# Patient Record
Sex: FEMALE | Race: BLACK OR AFRICAN AMERICAN | NOT HISPANIC OR LATINO
[De-identification: names, ages, dates, MRNs, and addresses within clinical notes are randomized per-mention and may not be internally consistent; named-entity substitution may affect disease eponyms.]

---

## 2017-06-16 ENCOUNTER — APPOINTMENT (OUTPATIENT)
Dept: OBGYN | Facility: CLINIC | Age: 27
End: 2017-06-16

## 2017-06-16 ENCOUNTER — LABORATORY RESULT (OUTPATIENT)
Age: 27
End: 2017-06-16

## 2017-06-16 VITALS
SYSTOLIC BLOOD PRESSURE: 119 MMHG | WEIGHT: 172 LBS | HEIGHT: 66 IN | DIASTOLIC BLOOD PRESSURE: 73 MMHG | BODY MASS INDEX: 27.64 KG/M2 | HEART RATE: 93 BPM

## 2017-06-16 DIAGNOSIS — K90.9 INTESTINAL MALABSORPTION, UNSPECIFIED: ICD-10-CM

## 2017-06-16 DIAGNOSIS — L73.8 OTHER SPECIFIED FOLLICULAR DISORDERS: ICD-10-CM

## 2017-06-20 LAB
ALBUMIN SERPL ELPH-MCNC: 4.5 G/DL
ALP BLD-CCNC: 96 U/L
ALT SERPL-CCNC: 10 U/L
ANION GAP SERPL CALC-SCNC: 15 MMOL/L
AST SERPL-CCNC: 13 U/L
BASOPHILS # BLD AUTO: 0.02 K/UL
BASOPHILS NFR BLD AUTO: 0.5 %
BILIRUB SERPL-MCNC: 0.6 MG/DL
BUN SERPL-MCNC: 12 MG/DL
CALCIUM SERPL-MCNC: 9.4 MG/DL
CHLORIDE SERPL-SCNC: 101 MMOL/L
CO2 SERPL-SCNC: 25 MMOL/L
CREAT SERPL-MCNC: 0.8 MG/DL
EOSINOPHIL # BLD AUTO: 0.12 K/UL
EOSINOPHIL NFR BLD AUTO: 2.7 %
GLUCOSE SERPL-MCNC: 72 MG/DL
HCT VFR BLD CALC: 40.4 %
HGB BLD-MCNC: 12.6 G/DL
IMM GRANULOCYTES NFR BLD AUTO: 0.2 %
LYMPHOCYTES # BLD AUTO: 1.65 K/UL
LYMPHOCYTES NFR BLD AUTO: 37.5 %
MAN DIFF?: NORMAL
MCHC RBC-ENTMCNC: 27.9 PG
MCHC RBC-ENTMCNC: 31.2 GM/DL
MCV RBC AUTO: 89.4 FL
MONOCYTES # BLD AUTO: 0.31 K/UL
MONOCYTES NFR BLD AUTO: 7 %
NEUTROPHILS # BLD AUTO: 2.29 K/UL
NEUTROPHILS NFR BLD AUTO: 52.1 %
PLATELET # BLD AUTO: 351 K/UL
POTASSIUM SERPL-SCNC: 3.8 MMOL/L
PROT SERPL-MCNC: 8 G/DL
RBC # BLD: 4.52 M/UL
RBC # FLD: 12.4 %
SODIUM SERPL-SCNC: 141 MMOL/L
T3FREE SERPL-MCNC: 2.89 PG/ML
T3RU NFR SERPL: 1.01 INDEX
T4 FREE SERPL-MCNC: 1.2 NG/DL
T4 SERPL-MCNC: 6.8 UG/DL
TSH SERPL-ACNC: 0.21 UIU/ML
WBC # FLD AUTO: 4.4 K/UL

## 2017-07-28 ENCOUNTER — EMERGENCY (EMERGENCY)
Facility: HOSPITAL | Age: 27
LOS: 1 days | Discharge: HOME | End: 2017-07-28
Attending: EMERGENCY MEDICINE | Admitting: EMERGENCY MEDICINE
Payer: COMMERCIAL

## 2017-07-28 VITALS
DIASTOLIC BLOOD PRESSURE: 78 MMHG | SYSTOLIC BLOOD PRESSURE: 120 MMHG | HEART RATE: 60 BPM | RESPIRATION RATE: 18 BRPM | TEMPERATURE: 100 F | OXYGEN SATURATION: 100 %

## 2017-07-28 DIAGNOSIS — Z98.84 BARIATRIC SURGERY STATUS: Chronic | ICD-10-CM

## 2017-07-28 DIAGNOSIS — O09.293 SUPERVISION OF PREGNANCY WITH OTHER POOR REPRODUCTIVE OR OBSTETRIC HISTORY, THIRD TRIMESTER: Chronic | ICD-10-CM

## 2017-07-28 DIAGNOSIS — Z98.89 OTHER SPECIFIED POSTPROCEDURAL STATES: Chronic | ICD-10-CM

## 2017-07-28 LAB
ALBUMIN SERPL ELPH-MCNC: 4.2 G/DL — SIGNIFICANT CHANGE UP (ref 3.3–5)
ALP SERPL-CCNC: 97 U/L — SIGNIFICANT CHANGE UP (ref 40–120)
ALT FLD-CCNC: 8 U/L — SIGNIFICANT CHANGE UP (ref 4–33)
AST SERPL-CCNC: 13 U/L — SIGNIFICANT CHANGE UP (ref 4–32)
BASOPHILS # BLD AUTO: 0.02 K/UL — SIGNIFICANT CHANGE UP (ref 0–0.2)
BASOPHILS NFR BLD AUTO: 0.4 % — SIGNIFICANT CHANGE UP (ref 0–2)
BILIRUB SERPL-MCNC: 0.4 MG/DL — SIGNIFICANT CHANGE UP (ref 0.2–1.2)
BUN SERPL-MCNC: 9 MG/DL — SIGNIFICANT CHANGE UP (ref 7–23)
CALCIUM SERPL-MCNC: 9.3 MG/DL — SIGNIFICANT CHANGE UP (ref 8.4–10.5)
CHLORIDE SERPL-SCNC: 100 MMOL/L — SIGNIFICANT CHANGE UP (ref 98–107)
CO2 SERPL-SCNC: 29 MMOL/L — SIGNIFICANT CHANGE UP (ref 22–31)
CREAT SERPL-MCNC: 0.82 MG/DL — SIGNIFICANT CHANGE UP (ref 0.5–1.3)
EOSINOPHIL # BLD AUTO: 0.07 K/UL — SIGNIFICANT CHANGE UP (ref 0–0.5)
EOSINOPHIL NFR BLD AUTO: 1.4 % — SIGNIFICANT CHANGE UP (ref 0–6)
GLUCOSE SERPL-MCNC: 86 MG/DL — SIGNIFICANT CHANGE UP (ref 70–99)
HCT VFR BLD CALC: 38.8 % — SIGNIFICANT CHANGE UP (ref 34.5–45)
HGB BLD-MCNC: 12.2 G/DL — SIGNIFICANT CHANGE UP (ref 11.5–15.5)
IMM GRANULOCYTES # BLD AUTO: 0.01 # — SIGNIFICANT CHANGE UP
IMM GRANULOCYTES NFR BLD AUTO: 0.2 % — SIGNIFICANT CHANGE UP (ref 0–1.5)
INR BLD: 1.03 — SIGNIFICANT CHANGE UP (ref 0.88–1.17)
LYMPHOCYTES # BLD AUTO: 1.28 K/UL — SIGNIFICANT CHANGE UP (ref 1–3.3)
LYMPHOCYTES # BLD AUTO: 24.8 % — SIGNIFICANT CHANGE UP (ref 13–44)
MCHC RBC-ENTMCNC: 28.2 PG — SIGNIFICANT CHANGE UP (ref 27–34)
MCHC RBC-ENTMCNC: 31.4 % — LOW (ref 32–36)
MCV RBC AUTO: 89.6 FL — SIGNIFICANT CHANGE UP (ref 80–100)
MONOCYTES # BLD AUTO: 0.39 K/UL — SIGNIFICANT CHANGE UP (ref 0–0.9)
MONOCYTES NFR BLD AUTO: 7.5 % — SIGNIFICANT CHANGE UP (ref 2–14)
NEUTROPHILS # BLD AUTO: 3.4 K/UL — SIGNIFICANT CHANGE UP (ref 1.8–7.4)
NEUTROPHILS NFR BLD AUTO: 65.7 % — SIGNIFICANT CHANGE UP (ref 43–77)
NRBC # FLD: 0 — SIGNIFICANT CHANGE UP
PLATELET # BLD AUTO: 277 K/UL — SIGNIFICANT CHANGE UP (ref 150–400)
PMV BLD: 9 FL — SIGNIFICANT CHANGE UP (ref 7–13)
POTASSIUM SERPL-MCNC: 4.4 MMOL/L — SIGNIFICANT CHANGE UP (ref 3.5–5.3)
POTASSIUM SERPL-SCNC: 4.4 MMOL/L — SIGNIFICANT CHANGE UP (ref 3.5–5.3)
PROT SERPL-MCNC: 7.9 G/DL — SIGNIFICANT CHANGE UP (ref 6–8.3)
PROTHROM AB SERPL-ACNC: 11.6 SEC — SIGNIFICANT CHANGE UP (ref 9.8–13.1)
RBC # BLD: 4.33 M/UL — SIGNIFICANT CHANGE UP (ref 3.8–5.2)
RBC # FLD: 11.5 % — SIGNIFICANT CHANGE UP (ref 10.3–14.5)
SODIUM SERPL-SCNC: 139 MMOL/L — SIGNIFICANT CHANGE UP (ref 135–145)
WBC # BLD: 5.17 K/UL — SIGNIFICANT CHANGE UP (ref 3.8–10.5)
WBC # FLD AUTO: 5.17 K/UL — SIGNIFICANT CHANGE UP (ref 3.8–10.5)

## 2017-07-28 PROCEDURE — 99284 EMERGENCY DEPT VISIT MOD MDM: CPT

## 2017-07-28 RX ORDER — SODIUM CHLORIDE 9 MG/ML
2000 INJECTION INTRAMUSCULAR; INTRAVENOUS; SUBCUTANEOUS ONCE
Qty: 0 | Refills: 0 | Status: COMPLETED | OUTPATIENT
Start: 2017-07-28 | End: 2017-07-28

## 2017-07-28 RX ORDER — METOCLOPRAMIDE HCL 10 MG
1 TABLET ORAL
Qty: 15 | Refills: 0 | OUTPATIENT
Start: 2017-07-28 | End: 2017-08-02

## 2017-07-28 RX ORDER — ACETAMINOPHEN 500 MG
975 TABLET ORAL ONCE
Qty: 0 | Refills: 0 | Status: COMPLETED | OUTPATIENT
Start: 2017-07-28 | End: 2017-07-28

## 2017-07-28 RX ORDER — DEXAMETHASONE 0.5 MG/5ML
10 ELIXIR ORAL ONCE
Qty: 0 | Refills: 0 | Status: COMPLETED | OUTPATIENT
Start: 2017-07-28 | End: 2017-07-28

## 2017-07-28 RX ORDER — METOCLOPRAMIDE HCL 10 MG
10 TABLET ORAL ONCE
Qty: 0 | Refills: 0 | Status: COMPLETED | OUTPATIENT
Start: 2017-07-28 | End: 2017-07-28

## 2017-07-28 RX ORDER — ACETAMINOPHEN 500 MG
1 TABLET ORAL
Qty: 50 | Refills: 0 | OUTPATIENT
Start: 2017-07-28

## 2017-07-28 RX ADMIN — SODIUM CHLORIDE 1000 MILLILITER(S): 9 INJECTION INTRAMUSCULAR; INTRAVENOUS; SUBCUTANEOUS at 09:38

## 2017-07-28 RX ADMIN — Medication 975 MILLIGRAM(S): at 09:38

## 2017-07-28 RX ADMIN — Medication 102 MILLIGRAM(S): at 11:04

## 2017-07-28 RX ADMIN — Medication 10 MILLIGRAM(S): at 09:38

## 2017-07-28 RX ADMIN — Medication 975 MILLIGRAM(S): at 10:35

## 2017-07-28 NOTE — ED ADULT TRIAGE NOTE - CHIEF COMPLAINT QUOTE
states " I have a severe head ache since yesterday" took Tylenol and Advil with no relief". denies n/v/fever , denies injury.

## 2017-07-28 NOTE — ED PROVIDER NOTE - MEDICAL DECISION MAKING DETAILS
27y F presenting with frontal HA since yesterday. Likely migraine vs. tension HA. Will do labs, IV fluids, and pain meds. pt agrees to get CT head if only medicine doesn't make her feel better.

## 2017-07-28 NOTE — ED PROVIDER NOTE - PSH
H/O cervical cerclage, currently pregnant, third trimester    History of D&C  2015  S/P bariatric surgery  2012 Gastric sleeve placement

## 2017-07-28 NOTE — ED PROVIDER NOTE - OBJECTIVE STATEMENT
27y F with hx of sleep apnea presents to the ED with frontal HA since yesterday. Pt states she woke up with the HA and has been worsening throughout the day yesterday. Pt describes HA as "pulsating, pressure, squeezing, and pounding". Took Tylenol yesterday to no relief and advil today, to no relief. No vision changes. Bright light does not bother her. No fever. Does not take birth control. Non smoker. Prevents her from doing daily activities. Denies trauma. Does not usually get HA.     FHx: DM and HTN 27y F with hx of sleep apnea presents to the ED with worsening frontal HA since yesterday. Pt states she woke up with the HA. Pt describes HA as "pulsating, pressure, squeezing, and pounding". Took Tylenol yesterday, to no relief and Advil today, to no relief. No vision changes. Bright light does not bother her. No fever. Does not take birth control. Non smoker. Prevents her from doing daily activities. Denies trauma. Does not usually get HA.     FHx: DM and HTN

## 2017-07-28 NOTE — ED PROVIDER NOTE - PLAN OF CARE
Take medications as prescribed. If symptoms return several times, follow up with Neurology (see list), as these may be recurrent migraine headaches. Return if pain not controlled with oral medications.

## 2017-07-28 NOTE — ED PROVIDER NOTE - CARE PLAN
Principal Discharge DX:	Headache Principal Discharge DX:	Headache  Instructions for follow-up, activity and diet:	Take medications as prescribed. If symptoms return several times, follow up with Neurology (see list), as these may be recurrent migraine headaches. Return if pain not controlled with oral medications.

## 2017-07-28 NOTE — ED PROVIDER NOTE - PMH
Incompetent cervix in pregnancy    Missed   multiple  Sleep apnea  was using CPAP machine prior to bariatric surgery in .

## 2017-07-28 NOTE — ED PROVIDER NOTE - PROGRESS NOTE DETAILS
Shana: Headache improved. Lab results unremarkable. Discharge home. Likely migraine (has 3/5 POUND criteria).

## 2017-11-20 ENCOUNTER — CHART COPY (OUTPATIENT)
Age: 27
End: 2017-11-20

## 2017-11-29 LAB
HBV SURFACE AG SER QL: NONREACTIVE
HCV AB SER QL: NONREACTIVE
HCV S/CO RATIO: 0.12 S/CO
HIV1+2 AB SPEC QL IA.RAPID: NONREACTIVE
T PALLIDUM AB SER QL IA: NEGATIVE

## 2017-12-01 ENCOUNTER — APPOINTMENT (OUTPATIENT)
Dept: OBGYN | Facility: CLINIC | Age: 27
End: 2017-12-01
Payer: COMMERCIAL

## 2017-12-01 VITALS
SYSTOLIC BLOOD PRESSURE: 116 MMHG | HEART RATE: 91 BPM | BODY MASS INDEX: 28.93 KG/M2 | WEIGHT: 180 LBS | DIASTOLIC BLOOD PRESSURE: 72 MMHG | HEIGHT: 66 IN

## 2017-12-01 DIAGNOSIS — Z87.42 PERSONAL HISTORY OF OTHER DISEASES OF THE FEMALE GENITAL TRACT: ICD-10-CM

## 2017-12-01 PROCEDURE — 99213 OFFICE O/P EST LOW 20 MIN: CPT

## 2017-12-04 ENCOUNTER — CHART COPY (OUTPATIENT)
Age: 27
End: 2017-12-04

## 2017-12-04 LAB
CANDIDA VAG CYTO: NOT DETECTED
G VAGINALIS+PREV SP MTYP VAG QL MICRO: DETECTED
T VAGINALIS VAG QL WET PREP: NOT DETECTED

## 2018-03-30 ENCOUNTER — OTHER (OUTPATIENT)
Age: 28
End: 2018-03-30

## 2018-04-03 ENCOUNTER — OTHER (OUTPATIENT)
Age: 28
End: 2018-04-03

## 2018-04-03 LAB
CANDIDA VAG CYTO: DETECTED
G VAGINALIS+PREV SP MTYP VAG QL MICRO: NOT DETECTED
T VAGINALIS VAG QL WET PREP: NOT DETECTED

## 2018-04-11 ENCOUNTER — OTHER (OUTPATIENT)
Age: 28
End: 2018-04-11

## 2018-05-22 ENCOUNTER — APPOINTMENT (OUTPATIENT)
Dept: OBGYN | Facility: CLINIC | Age: 28
End: 2018-05-22

## 2018-06-01 ENCOUNTER — MESSAGE (OUTPATIENT)
Age: 28
End: 2018-06-01

## 2018-06-05 ENCOUNTER — RESULT REVIEW (OUTPATIENT)
Age: 28
End: 2018-06-05

## 2018-06-05 LAB
ABO + RH PNL BLD: NORMAL
BASOPHILS # BLD AUTO: 0.02 K/UL
BASOPHILS NFR BLD AUTO: 0.3 %
BLD GP AB SCN SERPL QL: NORMAL
EOSINOPHIL # BLD AUTO: 0.28 K/UL
EOSINOPHIL NFR BLD AUTO: 4.2 %
HCT VFR BLD CALC: 34.6 %
HGB BLD-MCNC: 10.7 G/DL
IMM GRANULOCYTES NFR BLD AUTO: 0.3 %
LYMPHOCYTES # BLD AUTO: 2.65 K/UL
LYMPHOCYTES NFR BLD AUTO: 39.6 %
MAN DIFF?: NORMAL
MCHC RBC-ENTMCNC: 26.6 PG
MCHC RBC-ENTMCNC: 30.9 GM/DL
MCV RBC AUTO: 86.1 FL
MONOCYTES # BLD AUTO: 0.5 K/UL
MONOCYTES NFR BLD AUTO: 7.5 %
NEUTROPHILS # BLD AUTO: 3.23 K/UL
NEUTROPHILS NFR BLD AUTO: 48.1 %
PLATELET # BLD AUTO: 312 K/UL
RBC # BLD: 4.02 M/UL
RBC # FLD: 13.2 %
WBC # FLD AUTO: 6.7 K/UL

## 2018-06-08 ENCOUNTER — APPOINTMENT (OUTPATIENT)
Dept: OBGYN | Facility: CLINIC | Age: 28
End: 2018-06-08
Payer: COMMERCIAL

## 2018-06-08 VITALS
SYSTOLIC BLOOD PRESSURE: 136 MMHG | WEIGHT: 189 LBS | BODY MASS INDEX: 30.37 KG/M2 | HEIGHT: 66 IN | DIASTOLIC BLOOD PRESSURE: 78 MMHG

## 2018-06-08 DIAGNOSIS — Z78.9 OTHER SPECIFIED HEALTH STATUS: ICD-10-CM

## 2018-06-08 DIAGNOSIS — F17.200 NICOTINE DEPENDENCE, UNSPECIFIED, UNCOMPLICATED: ICD-10-CM

## 2018-06-08 DIAGNOSIS — Z30.09 ENCOUNTER FOR OTHER GENERAL COUNSELING AND ADVICE ON CONTRACEPTION: ICD-10-CM

## 2018-06-08 PROCEDURE — 99215 OFFICE O/P EST HI 40 MIN: CPT | Mod: 25

## 2018-06-08 PROCEDURE — 76815 OB US LIMITED FETUS(S): CPT

## 2018-06-08 RX ORDER — METOCLOPRAMIDE 10 MG/1
10 TABLET ORAL
Qty: 15 | Refills: 0 | Status: COMPLETED | COMMUNITY
Start: 2017-07-28 | End: 2018-06-08

## 2018-06-08 RX ORDER — NAPROXEN 500 MG/1
500 TABLET ORAL
Qty: 28 | Refills: 0 | Status: COMPLETED | COMMUNITY
Start: 2017-07-28 | End: 2018-06-08

## 2018-06-08 RX ORDER — METRONIDAZOLE 7.5 MG/G
0.75 GEL VAGINAL
Qty: 0.38 | Refills: 3 | Status: COMPLETED | COMMUNITY
Start: 2017-12-04 | End: 2018-06-08

## 2018-06-08 RX ORDER — NORETHINDRONE ACETATE AND ETHINYL ESTRADIOL 1MG-20(24)
1-20 KIT ORAL
Qty: 84 | Refills: 3 | Status: COMPLETED | COMMUNITY
Start: 2017-02-24 | End: 2018-06-08

## 2018-06-11 ENCOUNTER — RESULT REVIEW (OUTPATIENT)
Age: 28
End: 2018-06-11

## 2018-06-11 PROBLEM — Z30.09 GENERAL COUNSELLING AND ADVICE ON CONTRACEPTION: Status: ACTIVE | Noted: 2018-06-11

## 2018-06-11 LAB
C TRACH RRNA SPEC QL NAA+PROBE: NOT DETECTED
HBV SURFACE AG SER QL: NONREACTIVE
HCV AB SER QL: NONREACTIVE
HCV S/CO RATIO: 0.14 S/CO
HIV1+2 AB SPEC QL IA.RAPID: NONREACTIVE
N GONORRHOEA RRNA SPEC QL NAA+PROBE: NOT DETECTED
SOURCE AMPLIFICATION: NORMAL
T PALLIDUM AB SER QL IA: NEGATIVE

## 2018-06-12 ENCOUNTER — CHART COPY (OUTPATIENT)
Age: 28
End: 2018-06-12

## 2018-06-15 ENCOUNTER — APPOINTMENT (OUTPATIENT)
Dept: OBGYN | Facility: CLINIC | Age: 28
End: 2018-06-15
Payer: COMMERCIAL

## 2018-06-15 VITALS — DIASTOLIC BLOOD PRESSURE: 78 MMHG | HEIGHT: 66 IN | SYSTOLIC BLOOD PRESSURE: 119 MMHG

## 2018-06-15 PROCEDURE — 99213 OFFICE O/P EST LOW 20 MIN: CPT | Mod: NC

## 2018-06-15 PROCEDURE — 76815 OB US LIMITED FETUS(S): CPT

## 2018-06-18 ENCOUNTER — TRANSCRIPTION ENCOUNTER (OUTPATIENT)
Age: 28
End: 2018-06-18

## 2018-06-18 ENCOUNTER — OUTPATIENT (OUTPATIENT)
Dept: OUTPATIENT SERVICES | Facility: HOSPITAL | Age: 28
LOS: 1 days | End: 2018-06-18
Payer: COMMERCIAL

## 2018-06-18 VITALS
HEIGHT: 66 IN | WEIGHT: 186.07 LBS | SYSTOLIC BLOOD PRESSURE: 104 MMHG | OXYGEN SATURATION: 98 % | RESPIRATION RATE: 16 BRPM | TEMPERATURE: 98 F | DIASTOLIC BLOOD PRESSURE: 69 MMHG | HEART RATE: 82 BPM

## 2018-06-18 DIAGNOSIS — Z33.2 ENCOUNTER FOR ELECTIVE TERMINATION OF PREGNANCY: ICD-10-CM

## 2018-06-18 DIAGNOSIS — O07.4 FAILED ATTEMPTED TERMINATION OF PREGNANCY WITHOUT COMPLICATION: ICD-10-CM

## 2018-06-18 DIAGNOSIS — Z01.818 ENCOUNTER FOR OTHER PREPROCEDURAL EXAMINATION: ICD-10-CM

## 2018-06-18 DIAGNOSIS — Z98.89 OTHER SPECIFIED POSTPROCEDURAL STATES: Chronic | ICD-10-CM

## 2018-06-18 DIAGNOSIS — Z98.84 BARIATRIC SURGERY STATUS: Chronic | ICD-10-CM

## 2018-06-18 DIAGNOSIS — O09.293 SUPERVISION OF PREGNANCY WITH OTHER POOR REPRODUCTIVE OR OBSTETRIC HISTORY, THIRD TRIMESTER: Chronic | ICD-10-CM

## 2018-06-18 LAB
BLD GP AB SCN SERPL QL: NEGATIVE — SIGNIFICANT CHANGE UP
HCT VFR BLD CALC: 33.6 % — LOW (ref 34.5–45)
HGB BLD-MCNC: 10.5 G/DL — LOW (ref 11.5–15.5)
MCHC RBC-ENTMCNC: 26.7 PG — LOW (ref 27–34)
MCHC RBC-ENTMCNC: 31.3 GM/DL — LOW (ref 32–36)
MCV RBC AUTO: 85.5 FL — SIGNIFICANT CHANGE UP (ref 80–100)
PLATELET # BLD AUTO: 332 K/UL — SIGNIFICANT CHANGE UP (ref 150–400)
RBC # BLD: 3.93 M/UL — SIGNIFICANT CHANGE UP (ref 3.8–5.2)
RBC # FLD: 13.5 % — SIGNIFICANT CHANGE UP (ref 10.3–14.5)
RH IG SCN BLD-IMP: POSITIVE — SIGNIFICANT CHANGE UP
WBC # BLD: 5.19 K/UL — SIGNIFICANT CHANGE UP (ref 3.8–10.5)
WBC # FLD AUTO: 5.19 K/UL — SIGNIFICANT CHANGE UP (ref 3.8–10.5)

## 2018-06-18 PROCEDURE — G0463: CPT

## 2018-06-18 PROCEDURE — 86850 RBC ANTIBODY SCREEN: CPT

## 2018-06-18 PROCEDURE — 85027 COMPLETE CBC AUTOMATED: CPT

## 2018-06-18 PROCEDURE — 86900 BLOOD TYPING SEROLOGIC ABO: CPT

## 2018-06-18 PROCEDURE — 86901 BLOOD TYPING SEROLOGIC RH(D): CPT

## 2018-06-18 RX ORDER — SODIUM CHLORIDE 9 MG/ML
3 INJECTION INTRAMUSCULAR; INTRAVENOUS; SUBCUTANEOUS EVERY 8 HOURS
Qty: 0 | Refills: 0 | Status: DISCONTINUED | OUTPATIENT
Start: 2018-06-19 | End: 2018-07-04

## 2018-06-18 RX ORDER — LIDOCAINE HCL 20 MG/ML
0.2 VIAL (ML) INJECTION ONCE
Qty: 0 | Refills: 0 | Status: DISCONTINUED | OUTPATIENT
Start: 2018-06-19 | End: 2018-07-04

## 2018-06-18 NOTE — H&P PST ADULT - PMH
Incompetent cervix in pregnancy    Missed   multiple  Sleep apnea  was using CPAP machine prior to bariatric surgery in . Now resolved Current smoker  ocassional Cigars  GERD (gastroesophageal reflux disease)    Incompetent cervix in pregnancy    Missed   multiple  Sleep apnea  was using CPAP machine prior to bariatric surgery in . Now resolved

## 2018-06-18 NOTE — H&P PST ADULT - ADMIT DATE
----- Message from Marie Davis RN sent at 6/27/2017  2:35 PM CDT -----  Nannette called to report that she has not had any relief from the Alpha Lipoic and the increase in Sertraline.  She continues to have constant burning of her tongue and the burning on the left side of her face.  She is wondering if there is anything else she could try.  Please advise.    Thank you,  T   18-Jun-2018

## 2018-06-18 NOTE — H&P PST ADULT - HISTORY OF PRESENT ILLNESS
This is a 28 y/o female  with PMH:  s/p (): Gastric Sleeve for Morbid Obesity: Current BMI 30.0,  curent: occasional Cigar smoking,  s/p ( 2018) chemical  to terminate IUP approximately 7 weeks. Followup evaluation dx: failed Medical . Scheduled: Suction Curettage for IUP/ Insertion of I.U.D.

## 2018-06-18 NOTE — H&P PST ADULT - PSH
H/O cervical cerclage, currently pregnant, third trimester  ' 2016  History of D&C  2015  S/P bariatric surgery  2012 Gastric sleeve placement ( @ [facility]) History of D&C  2015  S/P bariatric surgery  2012 Gastric sleeve placement ( @ [facility])

## 2018-06-18 NOTE — H&P PST ADULT - NSANTHOSAYNRD_GEN_A_CORE
No. JONG screening performed.  STOP BANG Legend: 0-2 = LOW Risk; 3-4 = INTERMEDIATE Risk; 5-8 = HIGH Risk No. JONG screening performed.  STOP BANG Legend: 0-2 = LOW Risk; 3-4 = INTERMEDIATE Risk; 5-8 = HIGH Risk/Neck 14 in.

## 2018-06-19 ENCOUNTER — RESULT REVIEW (OUTPATIENT)
Age: 28
End: 2018-06-19

## 2018-06-19 ENCOUNTER — APPOINTMENT (OUTPATIENT)
Dept: OBGYN | Facility: CLINIC | Age: 28
End: 2018-06-19

## 2018-06-19 ENCOUNTER — OUTPATIENT (OUTPATIENT)
Dept: OUTPATIENT SERVICES | Facility: HOSPITAL | Age: 28
LOS: 1 days | End: 2018-06-19
Payer: COMMERCIAL

## 2018-06-19 VITALS
OXYGEN SATURATION: 100 % | HEART RATE: 82 BPM | SYSTOLIC BLOOD PRESSURE: 119 MMHG | RESPIRATION RATE: 18 BRPM | DIASTOLIC BLOOD PRESSURE: 68 MMHG

## 2018-06-19 VITALS
TEMPERATURE: 98 F | HEART RATE: 73 BPM | WEIGHT: 186.07 LBS | HEIGHT: 66 IN | OXYGEN SATURATION: 100 % | RESPIRATION RATE: 16 BRPM | SYSTOLIC BLOOD PRESSURE: 106 MMHG | DIASTOLIC BLOOD PRESSURE: 67 MMHG

## 2018-06-19 DIAGNOSIS — Z98.89 OTHER SPECIFIED POSTPROCEDURAL STATES: Chronic | ICD-10-CM

## 2018-06-19 DIAGNOSIS — Z33.2 ENCOUNTER FOR ELECTIVE TERMINATION OF PREGNANCY: ICD-10-CM

## 2018-06-19 DIAGNOSIS — Z98.84 BARIATRIC SURGERY STATUS: Chronic | ICD-10-CM

## 2018-06-19 PROCEDURE — 76998 US GUIDE INTRAOP: CPT | Mod: 26

## 2018-06-19 PROCEDURE — 59840 INDUCED ABORTION D&C: CPT

## 2018-06-19 PROCEDURE — 88305 TISSUE EXAM BY PATHOLOGIST: CPT | Mod: 26

## 2018-06-19 PROCEDURE — 58300 INSERT INTRAUTERINE DEVICE: CPT

## 2018-06-19 PROCEDURE — 88305 TISSUE EXAM BY PATHOLOGIST: CPT

## 2018-06-19 RX ORDER — CELECOXIB 200 MG/1
200 CAPSULE ORAL ONCE
Qty: 0 | Refills: 0 | Status: DISCONTINUED | OUTPATIENT
Start: 2018-06-19 | End: 2018-06-19

## 2018-06-19 RX ORDER — FAMOTIDINE 10 MG/ML
20 INJECTION INTRAVENOUS ONCE
Qty: 0 | Refills: 0 | Status: COMPLETED | OUTPATIENT
Start: 2018-06-19 | End: 2018-06-19

## 2018-06-19 RX ORDER — OXYCODONE HYDROCHLORIDE 5 MG/1
5 TABLET ORAL ONCE
Qty: 0 | Refills: 0 | Status: DISCONTINUED | OUTPATIENT
Start: 2018-06-19 | End: 2018-06-19

## 2018-06-19 RX ORDER — ACETAMINOPHEN 500 MG
1000 TABLET ORAL ONCE
Qty: 0 | Refills: 0 | Status: DISCONTINUED | OUTPATIENT
Start: 2018-06-19 | End: 2018-07-04

## 2018-06-19 RX ORDER — ONDANSETRON 8 MG/1
4 TABLET, FILM COATED ORAL ONCE
Qty: 0 | Refills: 0 | Status: DISCONTINUED | OUTPATIENT
Start: 2018-06-19 | End: 2018-07-04

## 2018-06-19 RX ORDER — SODIUM CHLORIDE 9 MG/ML
1000 INJECTION, SOLUTION INTRAVENOUS
Qty: 0 | Refills: 0 | Status: DISCONTINUED | OUTPATIENT
Start: 2018-06-19 | End: 2018-07-04

## 2018-06-19 RX ADMIN — FAMOTIDINE 20 MILLIGRAM(S): 10 INJECTION INTRAVENOUS at 13:57

## 2018-06-19 NOTE — BRIEF OPERATIVE NOTE - PROCEDURE
<<-----Click on this checkbox to enter Procedure IUD insertion  06/19/2018  liletta IUD insertion, lot number: [id number], exp: 4/2022  Active  MADDY  Dilation and curettage for termination of pregnancy  06/19/2018    Active  MADDY

## 2018-06-19 NOTE — PRE-ANESTHESIA EVALUATION ADULT - NSANTHOSAYNRD_GEN_A_CORE
Neck 14 in./No. JONG screening performed.  STOP BANG Legend: 0-2 = LOW Risk; 3-4 = INTERMEDIATE Risk; 5-8 = HIGH Risk

## 2018-06-19 NOTE — ASU DISCHARGE PLAN (ADULT/PEDIATRIC). - ACTIVITY LEVEL
nothing per rectum/no tampons/no douching/no heavy lifting/nothing per vagina/no tub baths/no intercourse

## 2018-06-19 NOTE — ASU DISCHARGE PLAN (ADULT/PEDIATRIC). - NOTIFY
Pain not relieved by Medications/GYN Fever>100.4/Persistent Nausea and Vomiting/Unable to Urinate/Inability to Tolerate Liquids or Foods/Bleeding that does not stop

## 2018-06-19 NOTE — ASU DISCHARGE PLAN (ADULT/PEDIATRIC). - NURSING INSTRUCTIONS
Tylenol/Motrin as needed for pain/discomfort.  Pads only, nothing vaginally until seen by MD.  Follow up with MD in 2-4 weeks.  Call for appt.

## 2018-06-19 NOTE — ASU PATIENT PROFILE, ADULT - PMH
Current smoker  ocassional Cigars  GERD (gastroesophageal reflux disease)    Incompetent cervix in pregnancy    Missed   multiple  Sleep apnea  was using CPAP machine prior to bariatric surgery in . Now resolved

## 2018-06-19 NOTE — ASU DISCHARGE PLAN (ADULT/PEDIATRIC). - MEDICATION SUMMARY - MEDICATIONS TO TAKE
I will START or STAY ON the medications listed below when I get home from the hospital:    Tylenol 325 mg oral tablet  -- 2 tab(s) by mouth every 4 hours, As Needed  -- Indication: For moderate pain, as needed    ibuprofen  -- 3 tab(s) by mouth every 6 hours, As Needed  -- Indication: For moderate pain, as needed    omeprazole 20 mg oral delayed release capsule  -- 1 cap(s) by mouth prn  -- Indication: For home medication I will START or STAY ON the medications listed below when I get home from the hospital:    Tylenol 325 mg oral tablet  -- 2 tab(s) by mouth every 4 hours, As Needed  -- Indication: For moderate pain, as needed    ibuprofen  -- 3 tab(s) by mouth every 6 hours, As Needed  -- Indication: For moderate pain, as needed    omeprazole 20 mg oral delayed release capsule  -- 1 cap(s) by mouth prn  -- Indication: For home medication    Liletta 52 mg intrauterine device  -- 1 each intrauterine once  -- Indication: For contraception (prevent pregnancy)

## 2018-06-19 NOTE — BRIEF OPERATIVE NOTE - OPERATION/FINDINGS
anteverted uterus approximately 6 weeks size.  some POC, gestational sac and villi noted.  LNG-IUS inserted to fundus, placement confirmed on TVUS in transverse and sagital position.  BT: O+

## 2018-06-19 NOTE — ASU PATIENT PROFILE, ADULT - PSH
History of D&C  2015  S/P bariatric surgery  2012 Gastric sleeve placement ( @ [facility])

## 2018-06-19 NOTE — ASU DISCHARGE PLAN (ADULT/PEDIATRIC). - ITEMS TO FOLLOWUP WITH YOUR PHYSICIAN'S
Expect abdominal cramping/pain and spotting for the next weeks. Take ibuprofen and Tylenol for cramping as needed. Use a pad as needed. Call your physician or go to the emergency room if you experience any of the following: heavy vaginal bleeding (soaking more than 2 pads in 1 hour for 2 hours), fever, chills, nausea, vomiting, or pain that is not controlled by medication. Follow-up with Dr. Du in 2 weeks. Expect abdominal cramping/pain and spotting for the next weeks. The Liletta IUD was inserted to prevent pregnancy. Take ibuprofen and Tylenol for cramping as needed. Use a pad as needed. Call your physician or go to the emergency room if you experience any of the following: heavy vaginal bleeding (soaking more than 2 pads in 1 hour for 2 hours), fever, chills, nausea, vomiting, or pain that is not controlled by medication. Follow-up with Dr. Du in 2 weeks.

## 2018-06-19 NOTE — ASU PREOP CHECKLIST - TO WHOM
Rn sneha Galindo sneha Galindo/report received from Nicole LESTER RN Susy Galindo/report received from Nicole LESTER RN

## 2018-06-19 NOTE — BRIEF OPERATIVE NOTE - PRE-OP DX
Failed medical , without complication  2018  ?IUP @ 7 2 weeks  Active  Maya Du Failed medical , without complication  2018  IUP @ 7 2/ weeks  Active  Maya Du

## 2018-06-25 ENCOUNTER — APPOINTMENT (OUTPATIENT)
Dept: OBGYN | Facility: CLINIC | Age: 28
End: 2018-06-25

## 2018-06-27 ENCOUNTER — APPOINTMENT (OUTPATIENT)
Dept: GASTROENTEROLOGY | Facility: CLINIC | Age: 28
End: 2018-06-27

## 2018-06-27 LAB — SURGICAL PATHOLOGY STUDY: SIGNIFICANT CHANGE UP

## 2018-07-06 ENCOUNTER — APPOINTMENT (OUTPATIENT)
Dept: OBGYN | Facility: CLINIC | Age: 28
End: 2018-07-06
Payer: COMMERCIAL

## 2018-07-06 VITALS
DIASTOLIC BLOOD PRESSURE: 76 MMHG | WEIGHT: 189.4 LBS | HEART RATE: 65 BPM | BODY MASS INDEX: 30.57 KG/M2 | SYSTOLIC BLOOD PRESSURE: 120 MMHG

## 2018-07-06 PROCEDURE — 76830 TRANSVAGINAL US NON-OB: CPT

## 2018-07-06 PROCEDURE — 99024 POSTOP FOLLOW-UP VISIT: CPT

## 2018-08-28 ENCOUNTER — APPOINTMENT (OUTPATIENT)
Dept: OBGYN | Facility: CLINIC | Age: 28
End: 2018-08-28
Payer: COMMERCIAL

## 2018-08-28 VITALS
DIASTOLIC BLOOD PRESSURE: 72 MMHG | BODY MASS INDEX: 30.22 KG/M2 | WEIGHT: 188 LBS | HEART RATE: 91 BPM | HEIGHT: 66 IN | SYSTOLIC BLOOD PRESSURE: 116 MMHG

## 2018-08-28 VITALS — HEIGHT: 66 IN

## 2018-08-28 PROBLEM — K21.9 GASTRO-ESOPHAGEAL REFLUX DISEASE WITHOUT ESOPHAGITIS: Chronic | Status: ACTIVE | Noted: 2018-06-18

## 2018-08-28 PROBLEM — F17.200 NICOTINE DEPENDENCE, UNSPECIFIED, UNCOMPLICATED: Chronic | Status: ACTIVE | Noted: 2018-06-18

## 2018-08-28 PROCEDURE — 99395 PREV VISIT EST AGE 18-39: CPT

## 2018-08-31 LAB
C TRACH RRNA SPEC QL NAA+PROBE: NOT DETECTED
CANDIDA VAG CYTO: NOT DETECTED
CYTOLOGY CVX/VAG DOC THIN PREP: NORMAL
G VAGINALIS+PREV SP MTYP VAG QL MICRO: DETECTED
N GONORRHOEA RRNA SPEC QL NAA+PROBE: NOT DETECTED
SOURCE AMPLIFICATION: NORMAL
T VAGINALIS VAG QL WET PREP: NOT DETECTED

## 2018-11-14 ENCOUNTER — MEDICATION RENEWAL (OUTPATIENT)
Age: 28
End: 2018-11-14

## 2018-11-29 ENCOUNTER — APPOINTMENT (OUTPATIENT)
Dept: ORTHOPEDIC SURGERY | Facility: CLINIC | Age: 28
End: 2018-11-29

## 2018-12-06 ENCOUNTER — APPOINTMENT (OUTPATIENT)
Dept: ORTHOPEDIC SURGERY | Facility: CLINIC | Age: 28
End: 2018-12-06

## 2018-12-10 ENCOUNTER — CHART COPY (OUTPATIENT)
Age: 28
End: 2018-12-10

## 2018-12-20 ENCOUNTER — OTHER (OUTPATIENT)
Age: 28
End: 2018-12-20

## 2018-12-31 ENCOUNTER — APPOINTMENT (OUTPATIENT)
Dept: OBGYN | Facility: CLINIC | Age: 28
End: 2018-12-31
Payer: COMMERCIAL

## 2018-12-31 VITALS
WEIGHT: 189 LBS | DIASTOLIC BLOOD PRESSURE: 70 MMHG | BODY MASS INDEX: 30.37 KG/M2 | HEART RATE: 83 BPM | SYSTOLIC BLOOD PRESSURE: 122 MMHG | HEIGHT: 66 IN

## 2018-12-31 PROCEDURE — 58301 REMOVE INTRAUTERINE DEVICE: CPT

## 2019-01-29 ENCOUNTER — CLINICAL ADVICE (OUTPATIENT)
Age: 29
End: 2019-01-29

## 2019-01-31 ENCOUNTER — OTHER (OUTPATIENT)
Age: 29
End: 2019-01-31

## 2019-03-05 ENCOUNTER — APPOINTMENT (OUTPATIENT)
Dept: OBGYN | Facility: CLINIC | Age: 29
End: 2019-03-05
Payer: COMMERCIAL

## 2019-03-05 DIAGNOSIS — Z98.890 OTHER SPECIFIED POSTPROCEDURAL STATES: ICD-10-CM

## 2019-03-05 DIAGNOSIS — Z32.01 ENCOUNTER FOR PREGNANCY TEST, RESULT POSITIVE: ICD-10-CM

## 2019-03-05 DIAGNOSIS — Z78.9 OTHER SPECIFIED HEALTH STATUS: ICD-10-CM

## 2019-03-05 DIAGNOSIS — Z31.69 ENCOUNTER FOR OTHER GENERAL COUNSELING AND ADVICE ON PROCREATION: ICD-10-CM

## 2019-03-05 DIAGNOSIS — Z87.898 PERSONAL HISTORY OF OTHER SPECIFIED CONDITIONS: ICD-10-CM

## 2019-03-05 DIAGNOSIS — Z30.432 ENCOUNTER FOR REMOVAL OF INTRAUTERINE CONTRACEPTIVE DEVICE: ICD-10-CM

## 2019-03-05 DIAGNOSIS — Z64.0 PROBLEMS RELATED TO UNWANTED PREGNANCY: ICD-10-CM

## 2019-03-05 DIAGNOSIS — O07.4 FAILED ATTEMPTED TERMINATION OF PREGNANCY W/OUT COMPLICATION: ICD-10-CM

## 2019-03-05 DIAGNOSIS — Z33.2 ENCOUNTER FOR ELECTIVE TERMINATION OF PREGNANCY: ICD-10-CM

## 2019-03-05 DIAGNOSIS — Z09 ENCOUNTER FOR FOLLOW-UP EXAMINATION AFTER COMPLETED TREATMENT FOR CONDITIONS OTHER THAN MALIGNANT NEOPLASM: ICD-10-CM

## 2019-03-05 DIAGNOSIS — Z34.90 ENCOUNTER FOR SUPERVISION OF NORMAL PREGNANCY, UNSPECIFIED, UNSPECIFIED TRIMESTER: ICD-10-CM

## 2019-03-05 DIAGNOSIS — Z20.2 CONTACT WITH AND (SUSPECTED) EXPOSURE TO INFECTIONS WITH A PREDOMINANTLY SEXUAL MODE OF TRANSMISSION: ICD-10-CM

## 2019-03-05 PROCEDURE — 99213 OFFICE O/P EST LOW 20 MIN: CPT

## 2019-03-05 RX ORDER — FLUCONAZOLE 150 MG/1
150 TABLET ORAL
Qty: 1 | Refills: 2 | Status: COMPLETED | COMMUNITY
Start: 2018-08-28 | End: 2019-03-05

## 2019-03-05 RX ORDER — FLUCONAZOLE 150 MG/1
150 TABLET ORAL
Qty: 1 | Refills: 2 | Status: COMPLETED | COMMUNITY
Start: 2018-03-30 | End: 2019-03-05

## 2019-03-05 RX ORDER — TERCONAZOLE 80 MG/1
80 SUPPOSITORY VAGINAL
Qty: 1 | Refills: 1 | Status: COMPLETED | COMMUNITY
Start: 2017-11-20 | End: 2019-03-05

## 2019-03-05 RX ORDER — FLUCONAZOLE 150 MG/1
150 TABLET ORAL
Qty: 1 | Refills: 2 | Status: COMPLETED | COMMUNITY
Start: 2018-06-12 | End: 2019-03-05

## 2019-03-05 RX ORDER — AZITHROMYCIN 500 MG/1
500 TABLET, FILM COATED ORAL
Qty: 2 | Refills: 0 | Status: COMPLETED | COMMUNITY
Start: 2018-06-08 | End: 2019-03-05

## 2019-03-05 RX ORDER — ONDANSETRON 4 MG/1
4 TABLET ORAL EVERY 6 HOURS
Qty: 4 | Refills: 0 | Status: COMPLETED | COMMUNITY
Start: 2018-06-08 | End: 2019-03-05

## 2019-03-05 RX ORDER — METRONIDAZOLE 500 MG/1
500 TABLET ORAL TWICE DAILY
Qty: 14 | Refills: 0 | Status: COMPLETED | COMMUNITY
Start: 2018-08-28 | End: 2019-03-05

## 2019-03-05 NOTE — COUNSELING
[Breast Self Exam] : breast self exam [Nutrition] : nutrition [Exercise] : exercise [Vitamins/Supplements] : vitamins/supplements [Bladder Hygiene] : bladder hygiene [Contraception] : contraception [Safe Sexual Practices] : safe sexual practices [Medication Management] : medication management [Vulvar Hygiene] : vulvar hygiene

## 2019-03-05 NOTE — CHIEF COMPLAINT
[Urgent Visit] : Urgent Visit [FreeTextEntry1] : pt c/o recurrent vaginal itch and discharge.No recent abx use .Pt usong diflucan weekly/had IUD removed and on OCP

## 2019-03-05 NOTE — PHYSICAL EXAM
[Normal] : cervix [Discharge] : a  ~M vaginal discharge was present [Moderate] : moderate [White] : white [Thin] : thin [No Bleeding] : there was no active vaginal bleeding [Uterine Adnexae] : were not tender and not enlarged

## 2019-03-14 LAB
A VAGINAE DNA VAG QL NAA+PROBE: NORMAL
BVAB2 DNA VAG QL NAA+PROBE: NORMAL
C KRUSEI DNA VAG QL NAA+PROBE: NEGATIVE
C KRUSEI DNA VAG QL NAA+PROBE: POSITIVE
C TRACH RRNA SPEC QL NAA+PROBE: NEGATIVE
MEGA1 DNA VAG QL NAA+PROBE: NORMAL
N GONORRHOEA RRNA SPEC QL NAA+PROBE: NEGATIVE
T VAGINALIS RRNA SPEC QL NAA+PROBE: NEGATIVE

## 2019-03-19 ENCOUNTER — APPOINTMENT (OUTPATIENT)
Age: 29
End: 2019-03-19
Payer: COMMERCIAL

## 2019-03-19 PROCEDURE — 10120 INC&RMVL FB SUBQ TISS SMPL: CPT

## 2019-04-04 ENCOUNTER — TRANSCRIPTION ENCOUNTER (OUTPATIENT)
Age: 29
End: 2019-04-04

## 2019-05-14 RX ORDER — OXYCODONE AND ACETAMINOPHEN 5; 325 MG/1; MG/1
5-325 TABLET ORAL
Qty: 6 | Refills: 0 | Status: DISCONTINUED | COMMUNITY
Start: 2018-06-08 | End: 2019-05-14

## 2019-05-14 RX ORDER — MISOPROSTOL 200 UG/1
200 TABLET ORAL
Qty: 4 | Refills: 0 | Status: DISCONTINUED | COMMUNITY
Start: 2018-06-08 | End: 2019-05-14

## 2019-05-20 ENCOUNTER — CHART COPY (OUTPATIENT)
Age: 29
End: 2019-05-20

## 2019-07-07 PROBLEM — K90.9 MALABSORPTION SYNDROME: Status: ACTIVE | Noted: 2017-06-16

## 2019-08-09 ENCOUNTER — RX RENEWAL (OUTPATIENT)
Age: 29
End: 2019-08-09

## 2019-08-09 DIAGNOSIS — Z30.41 ENCOUNTER FOR SURVEILLANCE OF CONTRACEPTIVE PILLS: ICD-10-CM

## 2019-08-15 ENCOUNTER — MEDICATION RENEWAL (OUTPATIENT)
Age: 29
End: 2019-08-15

## 2019-08-27 ENCOUNTER — APPOINTMENT (OUTPATIENT)
Dept: OBGYN | Facility: CLINIC | Age: 29
End: 2019-08-27
Payer: COMMERCIAL

## 2019-08-27 VITALS
HEART RATE: 66 BPM | WEIGHT: 182 LBS | HEIGHT: 66 IN | DIASTOLIC BLOOD PRESSURE: 79 MMHG | BODY MASS INDEX: 29.25 KG/M2 | SYSTOLIC BLOOD PRESSURE: 122 MMHG

## 2019-08-27 PROCEDURE — 99395 PREV VISIT EST AGE 18-39: CPT

## 2019-08-27 NOTE — PHYSICAL EXAM
[Awake] : awake [Alert] : alert [Acute Distress] : no acute distress [Mass] : no breast mass [Nipple Discharge] : no nipple discharge [Soft] : soft [Axillary LAD] : no axillary lymphadenopathy [Tender] : non tender [Oriented x3] : oriented to person, place, and time [Normal] : uterus [No Bleeding] : there was no active vaginal bleeding [Normal Position] : in a normal position [Uterine Adnexae] : were not tender and not enlarged

## 2019-08-27 NOTE — CHIEF COMPLAINT
[Annual Visit] : annual visit [FreeTextEntry1] : 29 y.o. P 1161  LMP 8/2/19, for annual exam. pt feels well. pt is currently on a triphasic OCP, prescribed by Dr. Bowers. , tolerating it better than the monophasic previoiusly prescribed OCP. pt has hx of HSV-2 and cervical insufficiency. pt is s/p bariatric surgery, ( gastric sleeve) by M.D. in [facility]. pt has hx of recurrent VVC. treated repeatedly symptomatically, but ultimately achieved relief with Diflucan regimen prescribed by Dr. Barakat. ( 3 days continuously, followed by once weekly ingestion x 5 mos. ). pt needs refill on OCP's \par Family hx significant for HTN- both parents. Diabetes- father, Prostate ca- father ROS- pt c/o worsening GERD sxs.

## 2019-08-30 LAB
ALBUMIN SERPL ELPH-MCNC: 4.2 G/DL
ALP BLD-CCNC: 51 U/L
ALT SERPL-CCNC: 12 U/L
ANION GAP SERPL CALC-SCNC: 11 MMOL/L
AST SERPL-CCNC: 14 U/L
BASOPHILS # BLD AUTO: 0.02 K/UL
BASOPHILS NFR BLD AUTO: 0.5 %
BILIRUB SERPL-MCNC: 0.3 MG/DL
BUN SERPL-MCNC: 8 MG/DL
C TRACH RRNA SPEC QL NAA+PROBE: NOT DETECTED
CALCIUM SERPL-MCNC: 9.3 MG/DL
CANDIDA VAG CYTO: NOT DETECTED
CHLORIDE SERPL-SCNC: 102 MMOL/L
CO2 SERPL-SCNC: 25 MMOL/L
CREAT SERPL-MCNC: 0.86 MG/DL
EOSINOPHIL # BLD AUTO: 0.12 K/UL
EOSINOPHIL NFR BLD AUTO: 2.9 %
G VAGINALIS+PREV SP MTYP VAG QL MICRO: DETECTED
GLUCOSE SERPL-MCNC: 95 MG/DL
HCT VFR BLD CALC: 38.6 %
HGB BLD-MCNC: 11.3 G/DL
HPV HIGH+LOW RISK DNA PNL CVX: DETECTED
IMM GRANULOCYTES NFR BLD AUTO: 0 %
LYMPHOCYTES # BLD AUTO: 2.22 K/UL
LYMPHOCYTES NFR BLD AUTO: 53.6 %
MAN DIFF?: NORMAL
MCHC RBC-ENTMCNC: 27 PG
MCHC RBC-ENTMCNC: 29.3 GM/DL
MCV RBC AUTO: 92.1 FL
MONOCYTES # BLD AUTO: 0.31 K/UL
MONOCYTES NFR BLD AUTO: 7.5 %
N GONORRHOEA RRNA SPEC QL NAA+PROBE: NOT DETECTED
NEUTROPHILS # BLD AUTO: 1.47 K/UL
NEUTROPHILS NFR BLD AUTO: 35.5 %
PLATELET # BLD AUTO: 306 K/UL
POTASSIUM SERPL-SCNC: 4.4 MMOL/L
PROT SERPL-MCNC: 7.4 G/DL
RBC # BLD: 4.19 M/UL
RBC # FLD: 11.9 %
SODIUM SERPL-SCNC: 138 MMOL/L
SOURCE AMPLIFICATION: NORMAL
T VAGINALIS VAG QL WET PREP: NOT DETECTED
TSH SERPL-ACNC: 0.42 UIU/ML
WBC # FLD AUTO: 4.14 K/UL

## 2019-09-03 ENCOUNTER — CHART COPY (OUTPATIENT)
Age: 29
End: 2019-09-03

## 2019-09-03 LAB — CYTOLOGY CVX/VAG DOC THIN PREP: NORMAL

## 2019-09-18 ENCOUNTER — APPOINTMENT (OUTPATIENT)
Dept: GASTROENTEROLOGY | Facility: CLINIC | Age: 29
End: 2019-09-18
Payer: COMMERCIAL

## 2019-09-18 VITALS
WEIGHT: 184 LBS | BODY MASS INDEX: 29.57 KG/M2 | OXYGEN SATURATION: 97 % | TEMPERATURE: 99.1 F | RESPIRATION RATE: 16 BRPM | DIASTOLIC BLOOD PRESSURE: 66 MMHG | HEART RATE: 97 BPM | SYSTOLIC BLOOD PRESSURE: 112 MMHG | HEIGHT: 66 IN

## 2019-09-18 DIAGNOSIS — Z98.84 BARIATRIC SURGERY STATUS: ICD-10-CM

## 2019-09-18 PROCEDURE — 99204 OFFICE O/P NEW MOD 45 MIN: CPT

## 2019-09-18 NOTE — REVIEW OF SYSTEMS
[As Noted in HPI] : as noted in HPI [Arthralgias] : arthralgias [Fever] : no fever [Chills] : no chills [Discharge From Eyes] : no purulent discharge from the eyes [Eyesight Problems] : no eyesight problems [Nosebleeds] : no nosebleeds [Cough] : no cough [Chest Pain] : no chest pain [SOB on Exertion] : no shortness of breath during exertion [Skin Lesions] : no skin lesions [Dysuria] : no dysuria [Itching] : no itching [Dizziness] : no dizziness [Anxiety] : no anxiety [Fainting] : no fainting [Depression] : no depression [Muscle Weakness] : no muscle weakness [Easy Bruising] : no tendency for easy bruising

## 2019-09-18 NOTE — ASSESSMENT
[FreeTextEntry1] : 1. Chronic gerd recently worsened. s/p gastric sleeve. recommend egd to r/o esophagitis,gastritis,pud etc.\par \par plan egd to  be scheduled\par          ppi daily\par          anitreflux diet\par \par 2. abdominal discomfort improed with bm, probably related to constipation, ? IBS.no alarm symptoms\par \par plan; miralax daily\par          if abdominal pain persists recommend abdominal us

## 2019-09-18 NOTE — HISTORY OF PRESENT ILLNESS
[FreeTextEntry1] : 30 yo female with c/o gerd since gastric bypass surgery in 2012, patient lost 90lbs. chronic constipation.\par Patient reports for the past 2 months had abdominal pain, 3/10 pain scale, described as crampy, dull lasts for hours, improved with bms and flatus. not related to food. patient reports chronic gerd even at night.\par Patient was on nexium in the past.Patient takes omeprazole for 3 days improves symptoms. patient reports 2 bms a week. no brbpr, no melena. no weight loss.\par \par h/o egd in 2012 per patient told h.pylori\par \par no family h/o colon or gastric cancer.

## 2019-09-18 NOTE — PHYSICAL EXAM
[General Appearance - Alert] : alert [General Appearance - In No Acute Distress] : in no acute distress [General Appearance - Well Nourished] : well nourished [General Appearance - Well Developed] : well developed [No Focal Deficits] : no focal deficits [Skin Lesions] : no skin lesions [Oriented To Time, Place, And Person] : oriented to person, place, and time [] : no respiratory distress [Heart Rate And Rhythm] : heart rate was normal and rhythm regular [Heart Sounds] : normal S1 and S2 [Bowel Sounds] : normal bowel sounds [Abdomen Soft] : soft [Abdomen Tenderness] : non-tender [FreeTextEntry1] : well healed scar [No CVA Tenderness] : no ~M costovertebral angle tenderness [Sensation] : the sensory exam was normal to light touch and pinprick [Motor Exam] : the motor exam was normal

## 2019-10-04 ENCOUNTER — APPOINTMENT (OUTPATIENT)
Dept: GASTROENTEROLOGY | Facility: CLINIC | Age: 29
End: 2019-10-04

## 2019-10-22 ENCOUNTER — CHART COPY (OUTPATIENT)
Age: 29
End: 2019-10-22

## 2019-10-28 LAB
M TB IFN-G BLD-IMP: NEGATIVE
QUANTIFERON TB PLUS MITOGEN MINUS NIL: 9.55 IU/ML
QUANTIFERON TB PLUS NIL: 0.02 IU/ML
QUANTIFERON TB PLUS TB1 MINUS NIL: -0.01 IU/ML
QUANTIFERON TB PLUS TB2 MINUS NIL: -0.01 IU/ML

## 2020-01-10 ENCOUNTER — RX RENEWAL (OUTPATIENT)
Age: 30
End: 2020-01-10

## 2020-02-07 ENCOUNTER — APPOINTMENT (OUTPATIENT)
Dept: OBGYN | Facility: CLINIC | Age: 30
End: 2020-02-07
Payer: COMMERCIAL

## 2020-02-07 VITALS
WEIGHT: 205 LBS | SYSTOLIC BLOOD PRESSURE: 126 MMHG | BODY MASS INDEX: 32.95 KG/M2 | HEIGHT: 66 IN | DIASTOLIC BLOOD PRESSURE: 76 MMHG | HEART RATE: 82 BPM

## 2020-02-07 DIAGNOSIS — Z87.42 PERSONAL HISTORY OF OTHER DISEASES OF THE FEMALE GENITAL TRACT: ICD-10-CM

## 2020-02-07 PROCEDURE — 99213 OFFICE O/P EST LOW 20 MIN: CPT

## 2020-02-07 NOTE — PHYSICAL EXAM
[Normal] : cervix [Foul Smelling] : not foul smelling [Moderate] : moderate [Discharge] : a  ~M vaginal discharge was present [Frothy] : frothy [No Bleeding] : there was no active vaginal bleeding [White] : white

## 2020-02-10 LAB
C TRACH RRNA SPEC QL NAA+PROBE: NOT DETECTED
CANDIDA VAG CYTO: NOT DETECTED
G VAGINALIS+PREV SP MTYP VAG QL MICRO: NOT DETECTED
N GONORRHOEA RRNA SPEC QL NAA+PROBE: NOT DETECTED
SOURCE AMPLIFICATION: NORMAL
T VAGINALIS VAG QL WET PREP: NOT DETECTED

## 2020-03-04 ENCOUNTER — EMERGENCY (EMERGENCY)
Facility: HOSPITAL | Age: 30
LOS: 1 days | Discharge: HOME | End: 2020-03-04
Admitting: EMERGENCY MEDICINE
Payer: COMMERCIAL

## 2020-03-04 VITALS
RESPIRATION RATE: 18 BRPM | OXYGEN SATURATION: 100 % | HEART RATE: 80 BPM | DIASTOLIC BLOOD PRESSURE: 69 MMHG | SYSTOLIC BLOOD PRESSURE: 128 MMHG | TEMPERATURE: 99 F

## 2020-03-04 DIAGNOSIS — Z98.84 BARIATRIC SURGERY STATUS: Chronic | ICD-10-CM

## 2020-03-04 DIAGNOSIS — Z98.89 OTHER SPECIFIED POSTPROCEDURAL STATES: Chronic | ICD-10-CM

## 2020-03-04 PROCEDURE — 93010 ELECTROCARDIOGRAM REPORT: CPT

## 2020-03-04 PROCEDURE — 99284 EMERGENCY DEPT VISIT MOD MDM: CPT | Mod: 25

## 2020-03-04 RX ORDER — SODIUM CHLORIDE 9 MG/ML
1000 INJECTION INTRAMUSCULAR; INTRAVENOUS; SUBCUTANEOUS ONCE
Refills: 0 | Status: COMPLETED | OUTPATIENT
Start: 2020-03-04 | End: 2020-03-04

## 2020-03-04 RX ORDER — MORPHINE SULFATE 50 MG/1
4 CAPSULE, EXTENDED RELEASE ORAL ONCE
Refills: 0 | Status: DISCONTINUED | OUTPATIENT
Start: 2020-03-04 | End: 2020-03-04

## 2020-03-04 RX ORDER — FAMOTIDINE 10 MG/ML
20 INJECTION INTRAVENOUS ONCE
Refills: 0 | Status: COMPLETED | OUTPATIENT
Start: 2020-03-04 | End: 2020-03-04

## 2020-03-04 RX ORDER — ONDANSETRON 8 MG/1
4 TABLET, FILM COATED ORAL ONCE
Refills: 0 | Status: COMPLETED | OUTPATIENT
Start: 2020-03-04 | End: 2020-03-04

## 2020-03-04 NOTE — ED PROVIDER NOTE - OBJECTIVE STATEMENT
30 y/o F h/o gastric sleeve 8 years ago p/w mid abdominal pain a/w nausea and diarrhea 28 y/o F h/o gerd, gastric sleeve 8 years ago p/w mid abdominal pain a/w nausea and diarrhea x today. Pt states pain started this morning upon waking. Pain is sharp with no radiation. Worse with palpation. Has been tolerating PO. Took omeprazole with no relief. No fever, chills, chest pain, sob, cough, dysuria, hematuria, back pain, vaginal bleeding/discharge. LMP Feb 22nd. No sick contacts or recent travel.

## 2020-03-04 NOTE — ED PROVIDER NOTE - PROGRESS NOTE DETAILS
Pt feeling better. Abd pain has resolved. Abd soft non tender. Pt tolerating PO. Labs unremarkable. Imaging shows possible gastritis. rx sent to pharmacy. Advised to follow up with PCP within the next 1-2 days. If any worsening symptoms return to Emergency Department immediately. Pt verbalizes agreement and understanding. VSS.

## 2020-03-04 NOTE — ED PROVIDER NOTE - PSH
History of D&C  2015  S/P bariatric surgery  2012 Gastric sleeve placement ( @ [facility])

## 2020-03-04 NOTE — ED PROVIDER NOTE - CLINICAL SUMMARY MEDICAL DECISION MAKING FREE TEXT BOX
30 y/o F h/o GERD, gastric sleeve p/w mid abdominal pain a/w nausea and diarrhea.  plan  - labs  - t&s, coags  - ctap  - ua  - ivf  - pain control  - GI cocktail   - reassess

## 2020-03-04 NOTE — ED ADULT TRIAGE NOTE - CHIEF COMPLAINT QUOTE
Pt arrives for epigastric pain with nausea since this morning that has worsened throughout the day. She state she took her omeprazole as prescribed, and states this pain does not feel like her acid reflux. States the pain takes her breath away. Pt appears uncomfortable. EKG in progress  Hx acid reflux

## 2020-03-04 NOTE — ED PROVIDER NOTE - NSFOLLOWUPINSTRUCTIONS_ED_ALL_ED_FT
Follow up with your primary care physician in 48-72 hours- bring copies of your results.  Return to the ER for worsening or persistent symptoms, including but not limited to worsening/persistent pain, shortness of breath, fevers, vomiting, lightheadedness, passing out and/or ANY NEW OR CONCERNING SYMPTOMS. If you have issues obtaining follow up, please call: [phone] to obtain a doctor or specialist who takes your insurance in your area.  You may call [phone] to make an appointment with the internal medicine clinic.

## 2020-03-04 NOTE — ED PROVIDER NOTE - PATIENT PORTAL LINK FT
You can access the FollowMyHealth Patient Portal offered by [facility] by registering at the following website: [email or web address]. By joining [employer]’s FollowMyHealth portal, you will also be able to view your health information using other applications (apps) compatible with our system.

## 2020-03-05 VITALS
HEART RATE: 73 BPM | DIASTOLIC BLOOD PRESSURE: 59 MMHG | TEMPERATURE: 100 F | RESPIRATION RATE: 16 BRPM | SYSTOLIC BLOOD PRESSURE: 124 MMHG | OXYGEN SATURATION: 100 %

## 2020-03-05 LAB
ALBUMIN SERPL ELPH-MCNC: 3.8 G/DL — SIGNIFICANT CHANGE UP (ref 3.3–5)
ALP SERPL-CCNC: 53 U/L — SIGNIFICANT CHANGE UP (ref 40–120)
ALT FLD-CCNC: 13 U/L — SIGNIFICANT CHANGE UP (ref 4–33)
ANION GAP SERPL CALC-SCNC: 13 MMO/L — SIGNIFICANT CHANGE UP (ref 7–14)
APPEARANCE UR: CLEAR — SIGNIFICANT CHANGE UP
APTT BLD: 28.5 SEC — SIGNIFICANT CHANGE UP (ref 27.5–36.3)
AST SERPL-CCNC: 19 U/L — SIGNIFICANT CHANGE UP (ref 4–32)
BACTERIA # UR AUTO: NEGATIVE — SIGNIFICANT CHANGE UP
BASE EXCESS BLDV CALC-SCNC: 2.9 MMOL/L — SIGNIFICANT CHANGE UP
BASOPHILS # BLD AUTO: 0.01 K/UL — SIGNIFICANT CHANGE UP (ref 0–0.2)
BASOPHILS NFR BLD AUTO: 0.2 % — SIGNIFICANT CHANGE UP (ref 0–2)
BILIRUB SERPL-MCNC: 0.4 MG/DL — SIGNIFICANT CHANGE UP (ref 0.2–1.2)
BILIRUB UR-MCNC: NEGATIVE — SIGNIFICANT CHANGE UP
BLD GP AB SCN SERPL QL: NEGATIVE — SIGNIFICANT CHANGE UP
BLOOD UR QL VISUAL: SIGNIFICANT CHANGE UP
BUN SERPL-MCNC: 9 MG/DL — SIGNIFICANT CHANGE UP (ref 7–23)
CALCIUM SERPL-MCNC: 9.3 MG/DL — SIGNIFICANT CHANGE UP (ref 8.4–10.5)
CHLORIDE SERPL-SCNC: 100 MMOL/L — SIGNIFICANT CHANGE UP (ref 98–107)
CO2 SERPL-SCNC: 24 MMOL/L — SIGNIFICANT CHANGE UP (ref 22–31)
COLOR SPEC: YELLOW — SIGNIFICANT CHANGE UP
CREAT SERPL-MCNC: 0.71 MG/DL — SIGNIFICANT CHANGE UP (ref 0.5–1.3)
EOSINOPHIL # BLD AUTO: 0.11 K/UL — SIGNIFICANT CHANGE UP (ref 0–0.5)
EOSINOPHIL NFR BLD AUTO: 2.1 % — SIGNIFICANT CHANGE UP (ref 0–6)
GLUCOSE SERPL-MCNC: 112 MG/DL — HIGH (ref 70–99)
GLUCOSE UR-MCNC: NEGATIVE — SIGNIFICANT CHANGE UP
HCO3 BLDV-SCNC: 25 MMOL/L — SIGNIFICANT CHANGE UP (ref 20–27)
HCT VFR BLD CALC: 35.9 % — SIGNIFICANT CHANGE UP (ref 34.5–45)
HGB BLD-MCNC: 10.2 G/DL — LOW (ref 11.5–15.5)
HYALINE CASTS # UR AUTO: NEGATIVE — SIGNIFICANT CHANGE UP
IMM GRANULOCYTES NFR BLD AUTO: 0.2 % — SIGNIFICANT CHANGE UP (ref 0–1.5)
INR BLD: 1.03 — SIGNIFICANT CHANGE UP (ref 0.88–1.17)
KETONES UR-MCNC: NEGATIVE — SIGNIFICANT CHANGE UP
LEUKOCYTE ESTERASE UR-ACNC: NEGATIVE — SIGNIFICANT CHANGE UP
LIDOCAIN IGE QN: 36.3 U/L — SIGNIFICANT CHANGE UP (ref 7–60)
LYMPHOCYTES # BLD AUTO: 1.11 K/UL — SIGNIFICANT CHANGE UP (ref 1–3.3)
LYMPHOCYTES # BLD AUTO: 21.2 % — SIGNIFICANT CHANGE UP (ref 13–44)
MCHC RBC-ENTMCNC: 24.7 PG — LOW (ref 27–34)
MCHC RBC-ENTMCNC: 28.4 % — LOW (ref 32–36)
MCV RBC AUTO: 86.9 FL — SIGNIFICANT CHANGE UP (ref 80–100)
MONOCYTES # BLD AUTO: 0.44 K/UL — SIGNIFICANT CHANGE UP (ref 0–0.9)
MONOCYTES NFR BLD AUTO: 8.4 % — SIGNIFICANT CHANGE UP (ref 2–14)
NEUTROPHILS # BLD AUTO: 3.56 K/UL — SIGNIFICANT CHANGE UP (ref 1.8–7.4)
NEUTROPHILS NFR BLD AUTO: 67.9 % — SIGNIFICANT CHANGE UP (ref 43–77)
NITRITE UR-MCNC: NEGATIVE — SIGNIFICANT CHANGE UP
NRBC # FLD: 0 K/UL — SIGNIFICANT CHANGE UP (ref 0–0)
PCO2 BLDV: 52 MMHG — HIGH (ref 41–51)
PH BLDV: 7.35 PH — SIGNIFICANT CHANGE UP (ref 7.32–7.43)
PH UR: 6 — SIGNIFICANT CHANGE UP (ref 5–8)
PLATELET # BLD AUTO: 368 K/UL — SIGNIFICANT CHANGE UP (ref 150–400)
PMV BLD: 9.9 FL — SIGNIFICANT CHANGE UP (ref 7–13)
PO2 BLDV: 24 MMHG — LOW (ref 35–40)
POTASSIUM SERPL-MCNC: 4 MMOL/L — SIGNIFICANT CHANGE UP (ref 3.5–5.3)
POTASSIUM SERPL-SCNC: 4 MMOL/L — SIGNIFICANT CHANGE UP (ref 3.5–5.3)
PROT SERPL-MCNC: 7.8 G/DL — SIGNIFICANT CHANGE UP (ref 6–8.3)
PROT UR-MCNC: 10 — SIGNIFICANT CHANGE UP
PROTHROM AB SERPL-ACNC: 11.9 SEC — SIGNIFICANT CHANGE UP (ref 9.8–13.1)
RBC # BLD: 4.13 M/UL — SIGNIFICANT CHANGE UP (ref 3.8–5.2)
RBC # FLD: 12.7 % — SIGNIFICANT CHANGE UP (ref 10.3–14.5)
RBC CASTS # UR COMP ASSIST: SIGNIFICANT CHANGE UP (ref 0–?)
RH IG SCN BLD-IMP: POSITIVE — SIGNIFICANT CHANGE UP
SAO2 % BLDV: 38.7 % — LOW (ref 60–85)
SODIUM SERPL-SCNC: 137 MMOL/L — SIGNIFICANT CHANGE UP (ref 135–145)
SP GR SPEC: 1.03 — SIGNIFICANT CHANGE UP (ref 1–1.04)
SQUAMOUS # UR AUTO: SIGNIFICANT CHANGE UP
UROBILINOGEN FLD QL: NORMAL — SIGNIFICANT CHANGE UP
WBC # BLD: 5.24 K/UL — SIGNIFICANT CHANGE UP (ref 3.8–10.5)
WBC # FLD AUTO: 5.24 K/UL — SIGNIFICANT CHANGE UP (ref 3.8–10.5)
WBC UR QL: SIGNIFICANT CHANGE UP (ref 0–?)

## 2020-03-05 PROCEDURE — 74177 CT ABD & PELVIS W/CONTRAST: CPT | Mod: 26

## 2020-03-05 RX ORDER — FAMOTIDINE 10 MG/ML
1 INJECTION INTRAVENOUS
Qty: 14 | Refills: 0
Start: 2020-03-05 | End: 2020-03-18

## 2020-03-05 RX ADMIN — FAMOTIDINE 20 MILLIGRAM(S): 10 INJECTION INTRAVENOUS at 00:28

## 2020-03-05 RX ADMIN — MORPHINE SULFATE 4 MILLIGRAM(S): 50 CAPSULE, EXTENDED RELEASE ORAL at 00:28

## 2020-03-05 RX ADMIN — ONDANSETRON 4 MILLIGRAM(S): 8 TABLET, FILM COATED ORAL at 00:32

## 2020-03-05 RX ADMIN — SODIUM CHLORIDE 1000 MILLILITER(S): 9 INJECTION INTRAMUSCULAR; INTRAVENOUS; SUBCUTANEOUS at 00:29

## 2020-03-11 ENCOUNTER — APPOINTMENT (OUTPATIENT)
Dept: GASTROENTEROLOGY | Facility: CLINIC | Age: 30
End: 2020-03-11
Payer: COMMERCIAL

## 2020-03-11 VITALS
SYSTOLIC BLOOD PRESSURE: 118 MMHG | HEART RATE: 80 BPM | BODY MASS INDEX: 32.78 KG/M2 | WEIGHT: 204 LBS | HEIGHT: 66 IN | DIASTOLIC BLOOD PRESSURE: 72 MMHG | RESPIRATION RATE: 16 BRPM | OXYGEN SATURATION: 98 % | TEMPERATURE: 97.8 F

## 2020-03-11 DIAGNOSIS — R19.7 DIARRHEA, UNSPECIFIED: ICD-10-CM

## 2020-03-11 DIAGNOSIS — R10.13 EPIGASTRIC PAIN: ICD-10-CM

## 2020-03-11 PROCEDURE — 99214 OFFICE O/P EST MOD 30 MIN: CPT

## 2020-03-11 RX ORDER — NYSTATIN AND TRIAMCINOLONE ACETONIDE 100000; 1 MG/G; MG/G
100000-0.1 CREAM TOPICAL TWICE DAILY
Qty: 1 | Refills: 1 | Status: DISCONTINUED | COMMUNITY
Start: 2018-09-14 | End: 2020-03-11

## 2020-03-11 RX ORDER — TERCONAZOLE 8 MG/G
0.8 CREAM VAGINAL
Qty: 1 | Refills: 1 | Status: DISCONTINUED | COMMUNITY
Start: 2020-02-07 | End: 2020-03-11

## 2020-03-11 RX ORDER — TERCONAZOLE 8 MG/G
0.8 CREAM VAGINAL
Qty: 1 | Refills: 1 | Status: DISCONTINUED | COMMUNITY
Start: 2018-04-11 | End: 2020-03-11

## 2020-03-11 RX ORDER — POLYETHYLENE GLYCOL 3350 17 G/17G
17 POWDER, FOR SOLUTION ORAL DAILY
Qty: 30 | Refills: 3 | Status: DISCONTINUED | COMMUNITY
Start: 2019-09-18 | End: 2020-03-11

## 2020-03-11 RX ORDER — NYSTATIN AND TRIAMCINOLONE ACETONIDE 100000; 1 MG/G; MG/G
100000-0.1 CREAM TOPICAL TWICE DAILY
Qty: 1 | Refills: 1 | Status: DISCONTINUED | COMMUNITY
Start: 2018-12-10 | End: 2020-03-11

## 2020-03-11 RX ORDER — BORIC ACID
POWDER (GRAM) MISCELLANEOUS
Qty: 14 | Refills: 1 | Status: DISCONTINUED | COMMUNITY
Start: 2018-12-10 | End: 2020-03-11

## 2020-03-11 RX ORDER — CIPROFLOXACIN HYDROCHLORIDE 250 MG/1
250 TABLET, FILM COATED ORAL
Qty: 6 | Refills: 0 | Status: DISCONTINUED | COMMUNITY
Start: 2019-02-04 | End: 2020-03-11

## 2020-03-11 RX ORDER — NYSTATIN AND TRIAMCINOLONE ACETONIDE 100000; 1 [USP'U]/G; MG/G
100000-0.1 OINTMENT TOPICAL TWICE DAILY
Qty: 1 | Refills: 1 | Status: DISCONTINUED | COMMUNITY
Start: 2018-12-20 | End: 2020-03-11

## 2020-03-11 RX ORDER — FLUCONAZOLE 150 MG/1
150 TABLET ORAL
Qty: 10 | Refills: 3 | Status: DISCONTINUED | COMMUNITY
Start: 2018-11-13 | End: 2020-03-11

## 2020-03-11 RX ORDER — FLUCONAZOLE 150 MG/1
150 TABLET ORAL
Qty: 1 | Refills: 2 | Status: DISCONTINUED | COMMUNITY
Start: 2019-03-14 | End: 2020-03-11

## 2020-03-11 RX ORDER — SERTRALINE HYDROCHLORIDE 50 MG/1
50 TABLET, FILM COATED ORAL
Qty: 30 | Refills: 0 | Status: DISCONTINUED | COMMUNITY
Start: 2019-02-18 | End: 2020-03-11

## 2020-03-11 RX ORDER — TERCONAZOLE 8 MG/G
0.8 CREAM VAGINAL
Qty: 1 | Refills: 1 | Status: DISCONTINUED | COMMUNITY
Start: 2018-09-14 | End: 2020-03-11

## 2020-03-11 RX ORDER — METRONIDAZOLE 7.5 MG/G
0.75 GEL VAGINAL
Qty: 1 | Refills: 1 | Status: DISCONTINUED | COMMUNITY
Start: 2019-05-20 | End: 2020-03-11

## 2020-03-11 NOTE — ASSESSMENT
[FreeTextEntry1] : 1. epigastric pain worse after heavy meal approximately one hour after,gallstones on ct scan and antral thickening on ct scan ddx; gastritis,gerd, pud, biliary disease normal lfts.\par \par plan  ppi daily\par         egd to evaluate\par        surgical referral\par \par 2. diarrhea, change in bowel habits,  recommend colonsocopy r/o colitis, colon lesion\par Discussed risks including but not limited to bleeding,infection,drug reaction, perforation,missed lesion,benefits and alternatives of colonoscopy/egd with patient  including no\par treatment and patient consents to procedure.\par \par \par plan stool cultures\par  colonoscopy to be schedule.\par

## 2020-03-11 NOTE — HISTORY OF PRESENT ILLNESS
[FreeTextEntry1] : 30 yo female with c/o epigastric pain 8/10 pain scale started one week ago, went to ER ct scan thickening gastric antrum and gallstones. given morphine/pepcid/zofran helped pain. Patient with c/o pain intermittent 5/10 pain scale, ache pain, lasts for  hours, worse after eating one hour after.  worse with heavy food. some nausea, no gerd,  patient on omeprazole and pepcid.  Patient also with c/o diarrhea. \par last bm  yesterday. \par no f/c/ns.

## 2020-03-11 NOTE — REVIEW OF SYSTEMS
[As Noted in HPI] : as noted in HPI [Fever] : no fever [Eyesight Problems] : no eyesight problems [Nosebleeds] : no nosebleeds [Nasal Discharge] : no nasal discharge [Pelvic Pain] : no pelvic pain [Arthralgias] : no arthralgias [Joint Pain] : no joint pain [Change In A Mole] : no change in a mole [Dizziness] : no dizziness [Muscle Weakness] : no muscle weakness [Easy Bruising] : no tendency for easy bruising

## 2020-03-11 NOTE — PHYSICAL EXAM
[General Appearance - Alert] : alert [General Appearance - In No Acute Distress] : in no acute distress [General Appearance - Well Nourished] : well nourished [General Appearance - Well Developed] : well developed [Sclera] : the sclera and conjunctiva were normal [Hearing Threshold Finger Rub Not [place]] : hearing was normal [Respiration, Rhythm And Depth] : normal respiratory rhythm and effort [Auscultation Breath Sounds / Voice Sounds] : lungs were clear to auscultation bilaterally [Heart Rate And Rhythm] : heart rate was normal and rhythm regular [Heart Sounds] : normal S1 and S2 [Bowel Sounds] : normal bowel sounds [Abdomen Soft] : soft [] : no rash [Skin Lesions] : no skin lesions [Oriented To Time, Place, And Person] : oriented to person, place, and time [FreeTextEntry1] : mild epigastric tenderness, on deep palpation [No Focal Deficits] : no focal deficits

## 2020-03-13 ENCOUNTER — APPOINTMENT (OUTPATIENT)
Dept: GASTROENTEROLOGY | Facility: CLINIC | Age: 30
End: 2020-03-13
Payer: COMMERCIAL

## 2020-03-13 ENCOUNTER — LABORATORY RESULT (OUTPATIENT)
Age: 30
End: 2020-03-13

## 2020-03-13 PROCEDURE — 43239 EGD BIOPSY SINGLE/MULTIPLE: CPT

## 2020-03-13 PROCEDURE — 81025 URINE PREGNANCY TEST: CPT

## 2020-03-13 PROCEDURE — 45380 COLONOSCOPY AND BIOPSY: CPT

## 2020-03-15 LAB
C DIFF TOX GENS STL QL NAA+PROBE: NORMAL
CDIFF BY PCR: NOT DETECTED
GI PCR PANEL, STOOL: NORMAL

## 2020-04-25 ENCOUNTER — MESSAGE (OUTPATIENT)
Age: 30
End: 2020-04-25

## 2020-05-13 ENCOUNTER — LABORATORY RESULT (OUTPATIENT)
Age: 30
End: 2020-05-13

## 2020-05-14 LAB
SARS-COV-2 IGG SERPL IA-ACNC: 37.1 AU/ML
SARS-COV-2 IGG SERPL QL IA: POSITIVE

## 2020-05-15 LAB
ANION GAP SERPL CALC-SCNC: 14 MMOL/L
APPEARANCE: CLEAR
BACTERIA: NEGATIVE
BASOPHILS # BLD AUTO: 0.03 K/UL
BASOPHILS NFR BLD AUTO: 0.6 %
BILIRUBIN URINE: NEGATIVE
BLOOD URINE: NEGATIVE
BUN SERPL-MCNC: 10 MG/DL
CALCIUM SERPL-MCNC: 9.6 MG/DL
CHLORIDE SERPL-SCNC: 101 MMOL/L
CO2 SERPL-SCNC: 22 MMOL/L
COLOR: NORMAL
CREAT SERPL-MCNC: 0.86 MG/DL
EOSINOPHIL # BLD AUTO: 0.14 K/UL
EOSINOPHIL NFR BLD AUTO: 2.9 %
GLUCOSE QUALITATIVE U: NEGATIVE
GLUCOSE SERPL-MCNC: 69 MG/DL
HCG SERPL-MCNC: <1 MIU/ML
HCT VFR BLD CALC: 35.8 %
HGB BLD-MCNC: 10.5 G/DL
HIV1+2 AB SPEC QL IA.RAPID: NONREACTIVE
HYALINE CASTS: 0 /LPF
IMM GRANULOCYTES NFR BLD AUTO: 0.2 %
INR PPP: 1 RATIO
KETONES URINE: NEGATIVE
LEUKOCYTE ESTERASE URINE: NEGATIVE
LYMPHOCYTES # BLD AUTO: 2.12 K/UL
LYMPHOCYTES NFR BLD AUTO: 44.5 %
MAN DIFF?: NORMAL
MCHC RBC-ENTMCNC: 25.5 PG
MCHC RBC-ENTMCNC: 29.3 GM/DL
MCV RBC AUTO: 87.1 FL
MICROSCOPIC-UA: NORMAL
MONOCYTES # BLD AUTO: 0.59 K/UL
MONOCYTES NFR BLD AUTO: 12.4 %
NEUTROPHILS # BLD AUTO: 1.87 K/UL
NEUTROPHILS NFR BLD AUTO: 39.4 %
NITRITE URINE: NEGATIVE
PH URINE: 6.5
PLATELET # BLD AUTO: 473 K/UL
POTASSIUM SERPL-SCNC: 4.9 MMOL/L
PROTEIN URINE: NEGATIVE
PT BLD: 11.4 SEC
RBC # BLD: 4.11 M/UL
RBC # FLD: 13.2 %
RED BLOOD CELLS URINE: 1 /HPF
SODIUM SERPL-SCNC: 137 MMOL/L
SPECIFIC GRAVITY URINE: 1.02
SQUAMOUS EPITHELIAL CELLS: 3 /HPF
UROBILINOGEN URINE: NORMAL
WBC # FLD AUTO: 4.76 K/UL
WHITE BLOOD CELLS URINE: 0 /HPF

## 2020-05-29 LAB
BASOPHILS # BLD AUTO: 0.03 K/UL
BASOPHILS NFR BLD AUTO: 0.5 %
EOSINOPHIL # BLD AUTO: 0.14 K/UL
EOSINOPHIL NFR BLD AUTO: 2.4 %
HCT VFR BLD CALC: 35.9 %
HGB BLD-MCNC: 10.4 G/DL
IMM GRANULOCYTES NFR BLD AUTO: 0.2 %
LYMPHOCYTES # BLD AUTO: 2.77 K/UL
LYMPHOCYTES NFR BLD AUTO: 46.8 %
MAN DIFF?: NORMAL
MCHC RBC-ENTMCNC: 25.4 PG
MCHC RBC-ENTMCNC: 29 GM/DL
MCV RBC AUTO: 87.6 FL
MONOCYTES # BLD AUTO: 0.41 K/UL
MONOCYTES NFR BLD AUTO: 6.9 %
NEUTROPHILS # BLD AUTO: 2.56 K/UL
NEUTROPHILS NFR BLD AUTO: 43.2 %
PLATELET # BLD AUTO: 342 K/UL
RBC # BLD: 4.1 M/UL
RBC # FLD: 13.8 %
WBC # FLD AUTO: 5.92 K/UL

## 2020-07-30 LAB — SARS-COV-2 N GENE NPH QL NAA+PROBE: NOT DETECTED

## 2020-08-21 ENCOUNTER — APPOINTMENT (OUTPATIENT)
Dept: OBGYN | Facility: CLINIC | Age: 30
End: 2020-08-21
Payer: COMMERCIAL

## 2020-08-21 VITALS
WEIGHT: 203 LBS | HEIGHT: 66 IN | TEMPERATURE: 97.5 F | HEART RATE: 81 BPM | DIASTOLIC BLOOD PRESSURE: 80 MMHG | SYSTOLIC BLOOD PRESSURE: 126 MMHG | BODY MASS INDEX: 32.62 KG/M2

## 2020-08-21 DIAGNOSIS — Z01.419 ENCOUNTER FOR GYNECOLOGICAL EXAMINATION (GENERAL) (ROUTINE) W/OUT ABNORMAL FINDINGS: ICD-10-CM

## 2020-08-21 PROCEDURE — 99395 PREV VISIT EST AGE 18-39: CPT

## 2020-08-21 RX ORDER — ENOXAPARIN SODIUM 100 MG/ML
40 INJECTION SUBCUTANEOUS
Qty: 30 | Refills: 3 | Status: COMPLETED | COMMUNITY
Start: 2020-06-01 | End: 2020-08-21

## 2020-08-21 RX ORDER — ALPRAZOLAM 0.25 MG/1
0.25 TABLET ORAL
Qty: 12 | Refills: 0 | Status: COMPLETED | COMMUNITY
Start: 2019-02-18 | End: 2020-08-21

## 2020-08-21 RX ORDER — METHOCARBAMOL 500 MG/1
500 TABLET, FILM COATED ORAL
Qty: 40 | Refills: 0 | Status: COMPLETED | COMMUNITY
Start: 2018-12-02 | End: 2020-08-21

## 2020-08-21 NOTE — CHIEF COMPLAINT
[Annual Visit] : annual visit [FreeTextEntry1] : 30 y.o. P 1161  LNMP 8/7/20 on Trisprintec  with regular cycles for annual exam. pt feels well. PMH - GERD on Omeprazole , prescribed by Dr. Dale, pt had upper and lower endoscopy, for reflux , and chronic constipation, which returned normal, and now pt is no longer constipated. . pt is taking Vit. D as well. PSHx - gastric sleeve -2012 , liposuction and fat transfer in [place] - June 2020. - ( Dr. Hicks). FH- HTN, Diabetes , Prostate ca. GYN hx - cervical HPV. ETOP's,  hx of HSV-2  OB hx C/S  x 1 - 2016. ROS- working daily at [employer] at [employer].

## 2020-08-21 NOTE — PHYSICAL EXAM
[Awake] : awake [Alert] : alert [Acute Distress] : no acute distress [Mass] : no breast mass [Axillary LAD] : no axillary lymphadenopathy [Nipple Discharge] : no nipple discharge [Soft] : soft [Oriented x3] : oriented to person, place, and time [Tender] : non tender [Normal] : cervix [No Bleeding] : there was no active vaginal bleeding [Uterine Adnexae] : were not tender and not enlarged [Anteversion] : anteverted

## 2020-08-24 LAB
BASOPHILS # BLD AUTO: 0.03 K/UL
BASOPHILS NFR BLD AUTO: 0.5 %
C TRACH RRNA SPEC QL NAA+PROBE: NOT DETECTED
CANDIDA VAG CYTO: NOT DETECTED
EOSINOPHIL # BLD AUTO: 0.18 K/UL
EOSINOPHIL NFR BLD AUTO: 3.3 %
G VAGINALIS+PREV SP MTYP VAG QL MICRO: NOT DETECTED
HBV SURFACE AG SER QL: NONREACTIVE
HCT VFR BLD CALC: NORMAL
HCV AB SER QL: NONREACTIVE
HCV S/CO RATIO: 0.1 S/CO
HGB BLD-MCNC: 9.4 G/DL
HIV1+2 AB SPEC QL IA.RAPID: NONREACTIVE
HPV HIGH+LOW RISK DNA PNL CVX: DETECTED
IMM GRANULOCYTES NFR BLD AUTO: 0.2 %
LYMPHOCYTES # BLD AUTO: 2.17 K/UL
LYMPHOCYTES NFR BLD AUTO: 39.7 %
MAN DIFF?: NORMAL
MCHC RBC-ENTMCNC: NORMAL
MCHC RBC-ENTMCNC: NORMAL
MCV RBC AUTO: NORMAL
MONOCYTES # BLD AUTO: 0.39 K/UL
MONOCYTES NFR BLD AUTO: 7.1 %
N GONORRHOEA RRNA SPEC QL NAA+PROBE: NOT DETECTED
NEUTROPHILS # BLD AUTO: 2.69 K/UL
NEUTROPHILS NFR BLD AUTO: 49.2 %
PLATELET # BLD AUTO: 415 K/UL
RBC # BLD: 4.1 M/UL
RBC # FLD: NORMAL
SOURCE AMPLIFICATION: NORMAL
T PALLIDUM AB SER QL IA: NEGATIVE
T VAGINALIS VAG QL WET PREP: NOT DETECTED
WBC # FLD AUTO: 5.47 K/UL

## 2020-08-28 LAB — CYTOLOGY CVX/VAG DOC THIN PREP: NORMAL

## 2020-09-02 LAB — SARS-COV-2 N GENE NPH QL NAA+PROBE: NOT DETECTED

## 2020-09-24 ENCOUNTER — APPOINTMENT (OUTPATIENT)
Dept: OBGYN | Facility: CLINIC | Age: 30
End: 2020-09-24
Payer: COMMERCIAL

## 2020-09-24 VITALS
SYSTOLIC BLOOD PRESSURE: 115 MMHG | BODY MASS INDEX: 32.3 KG/M2 | TEMPERATURE: 98.4 F | HEIGHT: 66 IN | HEART RATE: 71 BPM | DIASTOLIC BLOOD PRESSURE: 76 MMHG | WEIGHT: 201 LBS

## 2020-09-24 PROCEDURE — 57456 ENDOCERV CURETTAGE W/SCOPE: CPT

## 2020-09-24 NOTE — PROCEDURE
[Colposcopy] : Colposcopy  [Time out performed] : Pre-procedure time out performed.  Patient's name, date of birth and procedure confirmed. [Consent Obtained] : Consent obtained [Risks] : risks [Benefits] : benefits [Alternatives] : alternatives [Patient] : patient [Infection] : infection [Bleeding] : bleeding [Allergic Reaction] : allergic reaction [HPV High Risk] : HPV high risk [No Premedication] : no premedication [Colposcopy Adequate] : colposcopy adequate [Pap Performed] : pap not performed [SCI Fully Visualized] : SCI not fully visualized [ECC Performed] : ECC performed [No Abnormalities] : no abnormalities [Biopsy] : biopsy not taken [Hemostasis Obtained] : Hemostasis obtained [Tolerated Well] : the patient tolerated the procedure well

## 2020-10-01 LAB
CORE LAB BIOPSY: NORMAL
HCG UR QL: NEGATIVE
QUALITY CONTROL: YES

## 2020-12-15 PROBLEM — Z87.42 HISTORY OF VULVOVAGINITIS: Status: RESOLVED | Noted: 2017-12-01 | Resolved: 2020-12-15

## 2020-12-23 PROBLEM — Z87.42 HISTORY OF VAGINITIS: Status: RESOLVED | Noted: 2018-08-28 | Resolved: 2020-12-23

## 2020-12-23 PROBLEM — Z01.419 ENCOUNTER FOR ROUTINE GYNECOLOGICAL EXAMINATION: Status: RESOLVED | Noted: 2017-06-16 | Resolved: 2020-12-23

## 2020-12-28 ENCOUNTER — APPOINTMENT (OUTPATIENT)
Dept: OBGYN | Facility: CLINIC | Age: 30
End: 2020-12-28
Payer: COMMERCIAL

## 2020-12-28 DIAGNOSIS — N39.0 URINARY TRACT INFECTION, SITE NOT SPECIFIED: ICD-10-CM

## 2020-12-28 PROCEDURE — 99211 OFF/OP EST MAY X REQ PHY/QHP: CPT

## 2020-12-28 PROCEDURE — 99072 ADDL SUPL MATRL&STAF TM PHE: CPT

## 2020-12-28 PROCEDURE — 81003 URINALYSIS AUTO W/O SCOPE: CPT | Mod: QW

## 2020-12-29 LAB
BILIRUB UR QL STRIP: NEGATIVE
CLARITY UR: CLEAR
COLLECTION METHOD: NORMAL
GLUCOSE UR-MCNC: 100
HCG UR QL: 1 EU/DL
HGB UR QL STRIP.AUTO: NEGATIVE
KETONES UR-MCNC: NEGATIVE
LEUKOCYTE ESTERASE UR QL STRIP: NEGATIVE
NITRITE UR QL STRIP: POSITIVE
PH UR STRIP: 7
PROT UR STRIP-MCNC: NEGATIVE
SP GR UR STRIP: 1.01

## 2020-12-30 LAB — BACTERIA UR CULT: NORMAL

## 2021-01-22 ENCOUNTER — APPOINTMENT (OUTPATIENT)
Dept: OBGYN | Facility: CLINIC | Age: 31
End: 2021-01-22

## 2021-01-25 LAB — SARS-COV-2 N GENE NPH QL NAA+PROBE: NOT DETECTED

## 2021-03-02 RX ORDER — SULFAMETHOXAZOLE AND TRIMETHOPRIM 800; 160 MG/1; MG/1
800-160 TABLET ORAL
Qty: 6 | Refills: 0 | Status: DISCONTINUED | COMMUNITY
Start: 2020-12-28 | End: 2021-03-02

## 2021-03-03 RX ORDER — VALACYCLOVIR 500 MG/1
500 TABLET, FILM COATED ORAL
Qty: 40 | Refills: 3 | Status: ACTIVE | COMMUNITY
Start: 2021-03-03 | End: 1900-01-01

## 2021-03-11 ENCOUNTER — TRANSCRIPTION ENCOUNTER (OUTPATIENT)
Age: 31
End: 2021-03-11

## 2021-03-12 ENCOUNTER — RESULT REVIEW (OUTPATIENT)
Age: 31
End: 2021-03-12

## 2021-03-12 ENCOUNTER — OUTPATIENT (OUTPATIENT)
Dept: OUTPATIENT SERVICES | Facility: HOSPITAL | Age: 31
LOS: 1 days | Discharge: HOME | End: 2021-03-12
Payer: COMMERCIAL

## 2021-03-12 VITALS
TEMPERATURE: 98 F | RESPIRATION RATE: 21 BRPM | OXYGEN SATURATION: 100 % | HEIGHT: 66 IN | DIASTOLIC BLOOD PRESSURE: 70 MMHG | WEIGHT: 205.03 LBS | SYSTOLIC BLOOD PRESSURE: 115 MMHG | HEART RATE: 66 BPM

## 2021-03-12 DIAGNOSIS — Z98.84 BARIATRIC SURGERY STATUS: Chronic | ICD-10-CM

## 2021-03-12 DIAGNOSIS — Z98.89 OTHER SPECIFIED POSTPROCEDURAL STATES: Chronic | ICD-10-CM

## 2021-03-12 DIAGNOSIS — E66.01 MORBID (SEVERE) OBESITY DUE TO EXCESS CALORIES: ICD-10-CM

## 2021-03-12 DIAGNOSIS — K21.9 GASTRO-ESOPHAGEAL REFLUX DISEASE WITHOUT ESOPHAGITIS: Chronic | ICD-10-CM

## 2021-03-12 DIAGNOSIS — K21.9 GASTRO-ESOPHAGEAL REFLUX DISEASE WITHOUT ESOPHAGITIS: ICD-10-CM

## 2021-03-12 LAB — HCG UR QL: NEGATIVE — SIGNIFICANT CHANGE UP

## 2021-03-12 PROCEDURE — 88313 SPECIAL STAINS GROUP 2: CPT | Mod: 26

## 2021-03-12 PROCEDURE — 88313 SPECIAL STAINS GROUP 2: CPT

## 2021-03-12 PROCEDURE — 88305 TISSUE EXAM BY PATHOLOGIST: CPT | Mod: 26

## 2021-03-12 PROCEDURE — 81025 URINE PREGNANCY TEST: CPT

## 2021-03-12 PROCEDURE — 88312 SPECIAL STAINS GROUP 1: CPT

## 2021-03-12 PROCEDURE — 88312 SPECIAL STAINS GROUP 1: CPT | Mod: 26

## 2021-03-12 PROCEDURE — 88305 TISSUE EXAM BY PATHOLOGIST: CPT

## 2021-03-12 RX ORDER — LEVONORGESTREL 1.5 MG/1
1 TABLET ORAL
Qty: 0 | Refills: 0 | DISCHARGE

## 2021-03-12 RX ORDER — ACETAMINOPHEN 500 MG
2 TABLET ORAL
Qty: 0 | Refills: 0 | DISCHARGE

## 2021-03-12 NOTE — ASU PATIENT PROFILE, ADULT - PSH
Chronic GERD    History of D&C  2015  S/P bariatric surgery  2012 Gastric sleeve placement ( @ [facility])

## 2021-03-12 NOTE — ASU PATIENT PROFILE, ADULT - ACCEPTABLE
[place] EMERGENCY DEPARTMENT ([facility])  10/19/17   History     Chief Complaint   Patient presents with     Dehydration     HPI  King Gonsalo Bruno is a 68 year old male with a medical history of urethral cancer, GERD, hypertension and dehydration who presents to the Emergency Department for evaluation of dehydration. The patient has been on chemotherapy treatment since the first week of September of this year. The patient's most recent infusion visit for his chemotherapy treatment was C2D8 Cisplatin, Gemzar. The patient reports getting chemotherapy treatment every 2 weeks, with 1 week off in between. The patient reports since started chemotherapy treatment, he has been having problems with nausea, acid reflux and difficulty keeping fluids or food down. The patient was seen on 10/16/2017 at his infusion center where he received IV fluids and antiemetics. The patient reports whenever he attempts to eat or drink, he becomes nauseas and has acid reflux. The patient currently has prescriptions for Zofran, Compazine and Ativan at home. He denies any recent fevers or diarrhea.    I have reviewed the Medications, Allergies, Past Medical and Surgical History, and Social History in the [employer] system.    Past Medical History:   Diagnosis Date     Dysphagia 2013    Secondary to diverticulum in the hypopharynx     Esophageal pouch 2013    Left sided diverticulum in the hypopharynx      GERD (gastroesophageal reflux disease)      Hypertension      PONV (postoperative nausea and vomiting)        Past Surgical History:   Procedure Laterality Date     CYSTOSCOPY, BIOPSY BLADDER, COMBINED N/A 8/31/2017    Procedure: COMBINED CYSTOSCOPY, BIOPSY BLADDER;  Cystoscopy, Suprapubic Tube Placement with Ultrasound Guidiance, Uretheral Dilation;  Surgeon: Muriel Haddad MD;  Location: [facility]     CYSTOSTOMY, INSERT TUBE SUPRAPUBIC, COMBINED N/A 8/31/2017    Procedure: COMBINED CYSTOSTOMY, INSERT TUBE SUPRAPUBIC;;  Surgeon:  "Muriel Haddad MD;  Location:  OR     LARYNGOSCOPY  2013    Direct laryngoscopy with the use of rigid endoscopy and takedown of left hypopharyngeal diverticula.        Family History   Problem Relation Age of Onset     CEREBROVASCULAR DISEASE Mother      Prostate Cancer Father 84     Prostate Cancer Brother 63     DIABETES Brother        Social History   Substance Use Topics     Smoking status: Former Smoker     Packs/day: 1.00     Years: 20.00     Start date: 9/29/1972     Quit date: 9/29/1992     Smokeless tobacco: Never Used      Comment: quit in 1992     Alcohol use No      Comment: 1987 quit per personal choice.       No current facility-administered medications for this encounter.      Current Outpatient Prescriptions   Medication     alum & mag hydroxide-simethicone (MYLANTA/MAALOX) 200-200-20 MG/5ML SUSP suspension     OLANZapine (ZYPREXA) 5 MG tablet     omeprazole (PRILOSEC) 2 mg/mL SUSP     nystatin (MYCOSTATIN) 386709 UNIT/ML suspension     sucralfate (CARAFATE) 1 GM/10ML suspension     FIRST-OMEPRAZOLE 2 MG/ML SUSP     LORazepam (ATIVAN) 0.5 MG tablet     prochlorperazine (COMPAZINE) 10 MG tablet     ondansetron (ZOFRAN) 8 MG tablet     docusate sodium (COLACE) 100 MG capsule     amLODIPine (NORVASC) 5 MG tablet     dexamethasone (DECADRON) 4 MG tablet     HYDROXYZINE HCL PO     fluticasone (FLONASE) 50 MCG/ACT spray        Allergies   Allergen Reactions     Lisinopril Swelling         Review of Systems   ROS: 14 point ROS neg other than the symptoms noted above in the HPI.      Physical Exam   BP: (!) 177/92  Pulse: 87  Temp: 98.1  F (36.7  C)  Resp: 16  Height: 195.6 cm (6' 5\")  Weight: 70.3 kg (155 lb)  SpO2: 99 %      Physical Exam Exam:  Constitutional: healthy, alert and no distress  Head: Normocephalic. No masses, lesions, tenderness or abnormalities  Neck: Neck supple. No adenopathy. Thyroid symmetric, normal size,, Carotids without bruits.  ENT: ENT exam normal, no neck nodes or sinus " tenderness  Cardiovascular: negative, PMI normal. No lifts, heaves, or thrills. RRR. No murmurs, clicks gallops or rub  Respiratory: negative, Percussion normal. Good diaphragmatic excursion. Lungs clear  Gastrointestinal: Abdomen soft, non-tender. BS normal. No masses, organomegaly  : Deferred  Musculoskeletal: extremities normal- no gross deformities noted, gait normal and normal muscle tone  Skin: no suspicious lesions or rashes  Neurologic: Gait normal. Reflexes normal and symmetric. Sensation grossly WNL.  Psychiatric: mentation appears normal and affect normal/bright  Hematologic/Lymphatic/Immunologic: Normal cervical lymph nodes      ED Course   5:50 PM  The patient was seen and examined by Damien Olivo MD in Room ED20.     ED Course     Procedures               Labs Ordered and Resulted from Time of ED Arrival Up to the Time of Departure from the ED   CBC WITH PLATELETS DIFFERENTIAL - Abnormal; Notable for the following:        Result Value    WBC 3.3 (*)     RBC Count 3.65 (*)     Hemoglobin 10.7 (*)     Hematocrit 31.5 (*)     Absolute Neutrophil 1.4 (*)     All other components within normal limits   COMPREHENSIVE METABOLIC PANEL - Abnormal; Notable for the following:     Sodium 129 (*)     Chloride 92 (*)     Glucose 110 (*)     Creatinine 1.44 (*)     GFR Estimate 49 (*)     GFR Estimate If Black 59 (*)     Albumin 3.2 (*)     Protein Total 6.7 (*)     All other components within normal limits   LIPASE   LACTIC ACID WHOLE BLOOD            Assessments & Plan (with Medical Decision Making)     MDM  This is a 68-year-old male who has history of urethral cancer and currently getting chemo.  Patient states that he has had chemo since early September.  For the last few weeks he has had increasing weakness after chemotherapy which required IV fluid.  Last time he had a bolus of IV fluids was Monday at the infusion center and then he has felt better however, this a.m. and for the last 24 hours he has felt  increasing weakness and unable to eat or drink secondary to pain upon swallowing.  Patient denies any fevers or chills or cough.  Patient states that ever since his chemotherapy treatment, he has had burning type of pain in posterior oropharynx as well as in his esophagus.  There is increasing in pain secondary to any type of oral intake.  Patient is currently on antibiotics but he states that this has not helped.  The weakness is describes as generalized without any numbness or tingling.  No lateralizing aspect to his weakness.  Patient denies any headache.  Physical exam is mostly nonfocal with soft abdomen.  No oral lesions noted or appreciated.  Given his history and current findings, unclear etiology of his weakness.  We'll check for any occult infection.  Also given him some IV fluids as well as some pain medicine for his burning-type of symptoms of his abdomen.    Pt feeling better and no evidence of acute changes except elevated Cr and has had in the past and can f/u with PCP as outpatient.      I have reviewed the nursing notes.    I have reviewed the findings, diagnosis, plan and need for follow up with the patient.    Discharge Medication List as of 10/19/2017  8:24 PM      START taking these medications    Details   alum & mag hydroxide-simethicone (MYLANTA/MAALOX) 200-200-20 MG/5ML SUSP suspension Take 30 mLs by mouth every 4 hours as needed for indigestion, Disp-355 mL, R-1, Local Print             Final diagnoses:   Dehydration   Acute renal failure, unspecified acute renal failure type (H)     IMaikel, am serving as a trained medical scribe to document services personally performed by Damien Olivo MD, based on the provider's statements to me.   IDamien MD, was physically present and have reviewed and verified the accuracy of this note documented by Maikel Alvarez.    10/19/2017   [facility], [place], EMERGENCY DEPARTMENT     Damien Olivo MD  10/31/17 4116     0

## 2021-03-12 NOTE — ASU PATIENT PROFILE, ADULT - TRANSFUSION REACTION, PREVIOUS, PROFILE
After Visit Summary   9/13/2017    Denise Ralph    MRN: [id number]           Patient Information     Date Of Birth          1955        Visit Information        Provider Department      9/13/2017 11:30 AM Sarah Montesinos DPM, Podiatry/Foot and Ankle Surgery [facility] [place]        Care Instructions      DR. MONTESINOS'S CLINIC LOCATIONS:   MONDAY AM - [place] TUESDAY - [place]   [address] [place] [address] [place] [address]   [phone] / [phone] / [phone]       WEDNESDAY - [place] FRIDAY AM - [facility]   [address]   [place] [address] [place] [address]   [phone] / [phone]       FRIDAY PM - [place] SCHEDULE SURGERY: [phone]   [address] APPOINTMENTS: [phone]   [place] [address] BILLING QUESTIONS: [phone] / [phone]      OSTEOARTHRITIS OF THE FOOT & ANKLE  Osteoarthritis is a condition characterized by the breakdown and eventual loss of cartilage in one or more joints. Cartilage (the connective tissue found at the end of the bones in the joints) protects and cushions the bones during movement. When cartilage deteriorates or is lost, symptoms develop that can restrict one s ability to easily perform daily activities.  Osteoarthritis is also known as degenerative arthritis, reflecting its nature to develop as part of the aging process. As the most common form of arthritis, osteoarthritis affects millions of Americans. Some people refer to osteoarthritis simply as arthritis, even though there are many different types of arthritis.  Osteoarthritis appears at various joints throughout the body, including the hands, feet, spine, hips, and knees. In the foot, the disease most frequently occurs in the big toe, although it is also often found in the midfoot and ankle.  CAUSES  Osteoarthritis is considered a  wear and  tear  disease because the cartilage in the joint wears down with repeated stress and use over time. As the cartilage deteriorates and gets thinner, the bones lose their protective covering and eventually may rub together, causing pain and inflammation of the joint.  An injury may also lead to osteoarthritis, although it may take months or years after the injury for the condition to develop. For example, osteoarthritis in the big toe is often caused by kicking or jamming the toe, or by dropping something on the toe. Osteoarthritis in the midfoot is often caused by dropping something on it, or by a sprain or fracture. In the ankle, osteoarthritis is usually caused by a fracture and occasionally by a severe sprain.  Sometimes osteoarthritis develops as a result of abnormal foot mechanics such as flat feet or high arches. A flat foot causes less stability in the ligaments (bands of tissue that connect bones), resulting in excessive strain on the joints, which can cause arthritis. A high arch is rigid and lacks mobility, causing a jamming of joints that creates an increased risk of arthritis.  SYMPTOMS  People with osteoarthritis in the foot or ankle experience, in varying degrees, one or more of the following: Pain and stiffness in the joint, swelling in or near the joint, or difficulty walking or bending the joint.   Some patients with osteoarthritis also develop a bone spur (a bony protrusion) at the affected joint. Shoe pressure may cause pain at the site of a bone spur, and in some cases blisters or calluses may form over its surface. Bone spurs can also limit the movement of the joint.    DIAGNOSIS  In diagnosing osteoarthritis, the foot and ankle surgeon will examine the foot thoroughly, looking for swelling in the joint, limited mobility, and pain with movement. In some cases, deformity and/or enlargement (spur) of the joint may be noted. X-rays may be ordered to evaluate the extent of the disease.  NON-SURGICAL  TREATMENT  To help relieve symptoms, the surgeon may begin treating osteoarthritis with one or more of the following non-surgical approaches:  Oral medications. Nonsteroidal anti-inflammatory drugs (NSAIDs), such as ibuprofen, are often helpful in reducing the inflammation and pain. Occasionally a prescription for a steroid medication is needed to adequately reduce symptoms.   Orthotic devices. Custom orthotic devices (shoe inserts) are often prescribed to provide support to improve the foot s mechanics or cushioning to help minimize pain.   Bracing. Bracing, which restricts motion and supports the joint, can reduce pain during walking and help prevent further deformity.   Immobilization. Protecting the foot from movement by wearing a cast or removable cast-boot may be necessary to allow the inflammation to resolve.   Steroid injections. In some cases, steroid injections are applied to the affected joint to deliver anti-inflammatory medication.   Physical therapy. Exercises to strengthen the muscles, especially when the osteoarthritis occurs in the ankle, may give the patient greater stability and help avoid injury that might worsen the condition.   DO I NEED SURGERY?  When osteoarthritis has progressed substantially or failed to improve with non-surgical treatment, surgery may be recommended. In advanced cases, surgery may be the only option. The goal of surgery is to decrease pain and improve function. The foot and ankle surgeon will consider a number of factors when selecting the procedure best suited to the patient s condition and lifestyle.  FOOT CARE NURSES  If you are interested in having a foot care nurse come out to your   home, please call one of these contacts for more information:  [facility]  [phone] [facility]  [phone]   [facility]  [phone]  [facility] [phone]  [place]   [facility]  [phone]  At [facility]    [facility] [phone]           Body Mass Index (BMI)  Many things can cause foot and ankle problems. Foot structure, activity level, foot mechanics and injuries are common causes of pain.  One very important issue that often goes unmentioned, is body weight. Extra weight can cause increased stress on muscles, ligaments, bones and tendons.  Sometimes just a few extra pounds is all it takes to put one over her/his threshold. Without reducing that stress, it can be difficult to alleviate pain. Some people are uncomfortable addressing this issue, but we feel it is important for you to think about it. As Foot &  Ankle specialists, our job is addressing the lower extremity problem and possible causes. Regarding extra body weight, we encourage patients to discuss diet and weight management plans with their primary care doctors. It is this team approach that gives you the best opportunity for pain relief and getting you back on your feet.                  Follow-ups after your visit        Your next 10 appointments already scheduled     Sep 14, 2017  1:20 PM CDT   Return Visit with IRENE Loza CNP   [facility] ([facility])    [address] [address] [address] [phone]              Who to contact     If you have questions or need follow up information about today's clinic visit or your schedule please contact [facility] directly at [phone].  Normal or non-critical lab and imaging results will be communicated to you by MyChart, letter or phone within 4 business days after the clinic has received the results. If you do not hear from us within 7 days, please contact the clinic through MyChart or phone. If you have a critical or abnormal lab result, we will notify you by phone as soon as possible.  Submit refill requests through [facility] or call your pharmacy and they will forward the refill request to us.  "Please allow 3 business days for your refill to be completed.          Additional Information About Your Visit        MyChart Information     [facility] gives you secure access to your electronic health record. If you see a primary care provider, you can also send messages to your care team and make appointments. If you have questions, please call your primary care clinic.  If you do not have a primary care provider, please call [phone] and they will assist you.        Care EveryWhere ID     This is your Care EveryWhere ID. This could be used by other organizations to access your [facility] medical records  [id number]        Your Vitals Were     Height BMI (Body Mass Index)                5' 5\" (1.651 m) 22.47 kg/m2           Blood Pressure from Last 3 Encounters:   09/13/17 130/78   08/22/17 132/76   08/14/17 135/84    Weight from Last 3 Encounters:   09/13/17 135 lb (61.2 kg)   08/22/17 135 lb (61.2 kg)   08/14/17 135 lb (61.2 kg)              Today, you had the following     No orders found for display       Primary Care Provider Office Phone # Fax #    Juan F Smallwood [phone]       [address] [facility] [address]        Equal Access to Services     INEZ THOMPSON : Hadii aad ku hadasho Soomaali, waaxda luqadaha, qaybta kaalmada adeegyada, waxay idiin hayradhan anupamaeg manuel la'ronan ah. So [facility] [phone].    ATENCIÓN: Si habla español, tiene a simpson disposición servicios gratuitos de asistencia lingüística. Llame al [phone].    We comply with applicable federal civil rights laws and [place] laws. We do not discriminate on the basis of race, color, national origin, age, disability sex, sexual orientation or gender identity.            Thank you!     Thank you for choosing [facility] [place]  for your care. Our goal is always to provide you with excellent care. Hearing back from our patients is one way we can continue to improve our services. Please take a few minutes to " complete the written survey that you may receive in the mail after your visit with us. Thank you!             Your Updated Medication List - Protect others around you: Learn how to safely use, store and throw away your medicines at www.disposemymeds.org.          This list is accurate as of: 9/13/17 11:47 AM.  Always use your most recent med list.                   Brand Name Dispense Instructions for use Diagnosis    ALLEGRA PO      Take 30 mg by mouth daily        ALPRAZolam 0.25 MG tablet    XANAX    30 tablet    TAKE 1 TAB BY MOUTH 3 TIMES DAILY AS NEEDED FOR ANXIETY    Anxiety       atenolol 50 MG tablet    TENORMIN    180 tablet    TAKE 1 TABLET (50 MG) BY MOUTH 2 TIMES DAILY    Essential hypertension, benign, Palpitations       clindamycin 150 MG capsule    CLEOCIN    30 capsule    Take 1 capsule (150 mg) by mouth 3 times daily    Right foot pain, Ingrown nail of great toe of right foot       latanoprost 0.005 % ophthalmic solution    XALATAN     Place 1 drop into both eyes At Bedtime        OMEPRAZOLE PO      Take 20 mg by mouth every morning        oxyCODONE 5 MG IR tablet    ROXICODONE    45 tablet    Take 1 tablet (5 mg) by mouth every 6 hours as needed for moderate to severe pain    S/P lumbar fusion       traZODone 100 MG tablet    DESYREL    90 tablet    Take 1 tablet (100 mg) by mouth nightly as needed for sleep    Primary insomnia       triamcinolone 0.1 % cream    KENALOG    15 g    Apply sparingly to affected area two times daily for 5 days.    Eczema of external ear, left          no

## 2021-03-15 ENCOUNTER — NON-APPOINTMENT (OUTPATIENT)
Age: 31
End: 2021-03-15

## 2021-03-15 RX ORDER — OMEPRAZOLE 40 MG/1
40 CAPSULE, DELAYED RELEASE ORAL
Qty: 90 | Refills: 0 | Status: DISCONTINUED | COMMUNITY
Start: 2019-09-18 | End: 2021-03-15

## 2021-03-17 ENCOUNTER — OUTPATIENT (OUTPATIENT)
Dept: OUTPATIENT SERVICES | Facility: HOSPITAL | Age: 31
LOS: 1 days | End: 2021-03-17

## 2021-03-17 DIAGNOSIS — Z20.822 CONTACT WITH AND (SUSPECTED) EXPOSURE TO COVID-19: ICD-10-CM

## 2021-03-17 DIAGNOSIS — K21.9 GASTRO-ESOPHAGEAL REFLUX DISEASE WITHOUT ESOPHAGITIS: Chronic | ICD-10-CM

## 2021-03-17 DIAGNOSIS — Z98.84 BARIATRIC SURGERY STATUS: Chronic | ICD-10-CM

## 2021-03-17 DIAGNOSIS — Z98.89 OTHER SPECIFIED POSTPROCEDURAL STATES: Chronic | ICD-10-CM

## 2021-03-17 LAB — SARS-COV-2 RNA SPEC QL NAA+PROBE: SIGNIFICANT CHANGE UP

## 2021-03-18 DIAGNOSIS — Z98.84 BARIATRIC SURGERY STATUS: ICD-10-CM

## 2021-03-18 DIAGNOSIS — K21.00 GASTRO-ESOPHAGEAL REFLUX DISEASE WITH ESOPHAGITIS, WITHOUT BLEEDING: ICD-10-CM

## 2021-03-18 DIAGNOSIS — J45.909 UNSPECIFIED ASTHMA, UNCOMPLICATED: ICD-10-CM

## 2021-03-18 DIAGNOSIS — K29.50 UNSPECIFIED CHRONIC GASTRITIS WITHOUT BLEEDING: ICD-10-CM

## 2021-03-18 DIAGNOSIS — K22.8 OTHER SPECIFIED DISEASES OF ESOPHAGUS: ICD-10-CM

## 2021-03-18 DIAGNOSIS — R13.10 DYSPHAGIA, UNSPECIFIED: ICD-10-CM

## 2021-04-15 ENCOUNTER — TRANSCRIPTION ENCOUNTER (OUTPATIENT)
Age: 31
End: 2021-04-15

## 2021-04-19 NOTE — PRE-ANESTHESIA EVALUATION ADULT - NSANTHADDINFOFT_GEN_ALL_CORE
Denies CP, SOB, cough, fever.  acid reflux, not feeling today. Is been nauseated, no vomit Normal for race

## 2021-08-05 RX ORDER — NORGESTIMATE AND ETHINYL ESTRADIOL 7DAYSX3 LO
0.18/0.215/0.25 KIT ORAL
Qty: 3 | Refills: 3 | Status: DISCONTINUED | COMMUNITY
Start: 2021-08-03 | End: 2021-08-05

## 2021-08-05 RX ORDER — NORGESTIMATE AND ETHINYL ESTRADIOL 7DAYSX3 LO
0.18/0.215/0.25 KIT ORAL DAILY
Qty: 3 | Refills: 3 | Status: DISCONTINUED | COMMUNITY
Start: 2020-08-21 | End: 2021-08-05

## 2021-08-05 RX ORDER — NORGESTIMATE AND ETHINYL ESTRADIOL 7DAYSX3 28
0.18/0.215/0.25 KIT ORAL DAILY
Qty: 3 | Refills: 3 | Status: DISCONTINUED | COMMUNITY
Start: 2019-08-27 | End: 2021-08-05

## 2021-08-05 RX ORDER — NORGESTIMATE AND ETHINYL ESTRADIOL 0.25-0.035
0.25-35 KIT ORAL DAILY
Qty: 3 | Refills: 3 | Status: DISCONTINUED | COMMUNITY
Start: 2019-08-09 | End: 2021-08-05

## 2021-08-24 ENCOUNTER — APPOINTMENT (OUTPATIENT)
Dept: OBGYN | Facility: CLINIC | Age: 31
End: 2021-08-24
Payer: COMMERCIAL

## 2021-08-24 VITALS
DIASTOLIC BLOOD PRESSURE: 68 MMHG | HEART RATE: 88 BPM | HEIGHT: 66 IN | SYSTOLIC BLOOD PRESSURE: 113 MMHG | WEIGHT: 210 LBS | BODY MASS INDEX: 33.75 KG/M2

## 2021-08-24 DIAGNOSIS — B00.9 HERPESVIRAL INFECTION, UNSPECIFIED: ICD-10-CM

## 2021-08-24 DIAGNOSIS — B97.7 PAPILLOMAVIRUS AS THE CAUSE OF DISEASES CLASSIFIED ELSEWHERE: ICD-10-CM

## 2021-08-24 LAB — TSH SERPL-ACNC: 0.54 UIU/ML

## 2021-08-24 PROCEDURE — 99395 PREV VISIT EST AGE 18-39: CPT

## 2021-08-24 NOTE — DISCUSSION/SUMMARY
[FreeTextEntry1] : A - WWV\par      hx of GERD\par     hx of HSV-2\par      hx of cervical HPV\par     hx of recurrent VVC\par     hx of chronic anemia\par     s/p bariatric surgrey \par \par P- CBC. TSH, Hepatitis B s Ag, Hepatitis C ab, syphilis screen, HIV, Gen orobe and pap , and Affirmed done\par      exercise encouraged,\par    nutritional counseling provided\par     will sent for pelvic sono to assess uterus and adnexae \par     f/u 1 year

## 2021-08-24 NOTE — HISTORY OF PRESENT ILLNESS
[FreeTextEntry1] : 31 y.o. P 1161  LNMP 7/29/21, for annual exam. pt is on OCP's. , with regular cycles. PMH- GERD- on Pantoprazole,  prescribed by Dr. Dale. - G.I., pt has chronic anemia , hypochromic microcytic, s/p iron infusion x 2, , f/u for CBC in 3 weeks. ( Sept. 2021). , . PCP - Dr. Mercado . PSHx - gastric sleeve - 2012. liposuction and fat transfer in June 2020. ( Dr. Hicks). FH- HTN , Diabetes , Prostate ca. GYN hx - cervical HPV, ETOP's , HSV- 2, last outbreak in Feb. 2021. takes Valtrex prn , pt has been followed by Bora Villalobos for Recurrent VVC  OB hx - C/S x 1, - 2016 son is 5 yrs old. , ROS - [place] at [address] [facility].

## 2021-08-25 LAB
BACTERIA UR CULT: NORMAL
BASOPHILS # BLD AUTO: 0.03 K/UL
BASOPHILS NFR BLD AUTO: 0.4 %
CANDIDA VAG CYTO: NOT DETECTED
EOSINOPHIL # BLD AUTO: 0.18 K/UL
EOSINOPHIL NFR BLD AUTO: 2.7 %
G VAGINALIS+PREV SP MTYP VAG QL MICRO: NOT DETECTED
HBV SURFACE AG SER QL: NONREACTIVE
HCT VFR BLD CALC: 36.4 %
HCV AB SER QL: NONREACTIVE
HCV S/CO RATIO: 0.11 S/CO
HGB BLD-MCNC: 10 G/DL
HIV1+2 AB SPEC QL IA.RAPID: NONREACTIVE
HPV HIGH+LOW RISK DNA PNL CVX: NOT DETECTED
IMM GRANULOCYTES NFR BLD AUTO: 0.4 %
LYMPHOCYTES # BLD AUTO: 1.68 K/UL
LYMPHOCYTES NFR BLD AUTO: 25 %
MAN DIFF?: NORMAL
MCHC RBC-ENTMCNC: 23 PG
MCHC RBC-ENTMCNC: 27.5 GM/DL
MCV RBC AUTO: 83.9 FL
MONOCYTES # BLD AUTO: 0.45 K/UL
MONOCYTES NFR BLD AUTO: 6.7 %
NEUTROPHILS # BLD AUTO: 4.35 K/UL
NEUTROPHILS NFR BLD AUTO: 64.8 %
PLATELET # BLD AUTO: 538 K/UL
RBC # BLD: 4.34 M/UL
RBC # FLD: 24.7 %
T PALLIDUM AB SER QL IA: NEGATIVE
T VAGINALIS VAG QL WET PREP: NOT DETECTED
WBC # FLD AUTO: 6.72 K/UL

## 2021-08-26 LAB
C TRACH RRNA SPEC QL NAA+PROBE: NOT DETECTED
N GONORRHOEA RRNA SPEC QL NAA+PROBE: NOT DETECTED
SOURCE AMPLIFICATION: NORMAL

## 2021-08-30 LAB — CYTOLOGY CVX/VAG DOC THIN PREP: NORMAL

## 2021-12-21 ENCOUNTER — APPOINTMENT (OUTPATIENT)
Dept: OBGYN | Facility: CLINIC | Age: 31
End: 2021-12-21

## 2021-12-24 LAB — SARS-COV-2 N GENE NPH QL NAA+PROBE: NOT DETECTED

## 2021-12-26 LAB
FLU A RESULT: NOT DETECTED
FLU B RESULT: NOT DETECTED
RSV RESULT: NOT DETECTED

## 2022-01-05 NOTE — ED ADULT TRIAGE NOTE - BP NONINVASIVE SYSTOLIC (MM HG)
128 Closure 2 Information: This tab is for additional flaps and grafts, including complex repair and grafts and complex repair and flaps. You can also specify a different location for the additional defect, if the location is the same you do not need to select a new one. We will insert the automated text for the repair you select below just as we do for solitary flaps and grafts. Please note that at this time if you select a location with a different insurance zone you will need to override the ICD10 and CPT if appropriate.

## 2022-01-07 ENCOUNTER — APPOINTMENT (OUTPATIENT)
Dept: OBGYN | Facility: CLINIC | Age: 32
End: 2022-01-07

## 2022-01-10 LAB — SARS-COV-2 N GENE NPH QL NAA+PROBE: DETECTED

## 2022-01-15 ENCOUNTER — TRANSCRIPTION ENCOUNTER (OUTPATIENT)
Age: 32
End: 2022-01-15

## 2022-01-21 ENCOUNTER — APPOINTMENT (OUTPATIENT)
Dept: OBGYN | Facility: CLINIC | Age: 32
End: 2022-01-21
Payer: COMMERCIAL

## 2022-01-21 ENCOUNTER — ASOB RESULT (OUTPATIENT)
Age: 32
End: 2022-01-21

## 2022-01-21 VITALS
HEIGHT: 66 IN | WEIGHT: 210 LBS | DIASTOLIC BLOOD PRESSURE: 74 MMHG | BODY MASS INDEX: 33.75 KG/M2 | HEART RATE: 71 BPM | SYSTOLIC BLOOD PRESSURE: 131 MMHG

## 2022-01-21 DIAGNOSIS — Z32.01 ENCOUNTER FOR PREGNANCY TEST, RESULT POSITIVE: ICD-10-CM

## 2022-01-21 DIAGNOSIS — O99.840 BARIATRIC SURGERY STATUS COMPLICATING PREGNANCY, UNSPECIFIED TRIMESTER: ICD-10-CM

## 2022-01-21 PROCEDURE — 99213 OFFICE O/P EST LOW 20 MIN: CPT

## 2022-01-21 PROCEDURE — 76817 TRANSVAGINAL US OBSTETRIC: CPT

## 2022-01-21 NOTE — HISTORY OF PRESENT ILLNESS
[FreeTextEntry1] : 31 y.o. P 1161  LNMP 12/6/21 presents for confirmation of pregnancy , pt feels well, but has recently been diagnosed with bursitis of right shoulder. pt is taking NSAID and is scheduled for MRI tomorrow. pt is being followed by Shoaib . pt is s/p gastric sleeve with resultant GERD and RENATA s/p iron infusion x 2 . pt has hx of HSV-2 and cevical HPV. . pt is a previous c/s x 1. - 2016. pt also has hx of recurrent VVC followed by LOVEYL Villalobos M.D.  pt is also recently s/p COVID breakthrough infection, ( mild symptoms). pt is vaccinated but not boosted.

## 2022-01-21 NOTE — PHYSICAL EXAM
[Labia Majora] : normal [Labia Minora] : normal [Normal] : normal [Uterine Adnexae] : normal [Enlarged ___ wks] : enlarged [unfilled] ~Uweeks [Anteversion] : anteverted [FreeTextEntry6] : uterine myomata  palpated.

## 2022-01-21 NOTE — DISCUSSION/SUMMARY
[FreeTextEntry1] : A - IUP at 6 weeks 4 days\par      Cervical insufficiency\par      previous c/s x 1\par      HSV-2\par      GERD\par      s/p gastric sleeve\par \par P- f/u 1 month for NT \par     EDC 9/12/22 , P 1161\par    pt has PNV\par     initial prenatal labs done\par      pt has PNV, will plan for cerclage placement  \par      all questions answered,

## 2022-01-24 LAB
ABO + RH PNL BLD: NORMAL
B19V IGG SER QL IA: 5.03 INDEX
B19V IGG+IGM SER-IMP: NORMAL
B19V IGG+IGM SER-IMP: POSITIVE
B19V IGM FLD-ACNC: 0.11 INDEX
B19V IGM SER-ACNC: NEGATIVE
BACTERIA UR CULT: NORMAL
BASOPHILS # BLD AUTO: 0.02 K/UL
BASOPHILS NFR BLD AUTO: 0.3 %
BLD GP AB SCN SERPL QL: NORMAL
C TRACH RRNA SPEC QL NAA+PROBE: NOT DETECTED
EOSINOPHIL # BLD AUTO: 0.17 K/UL
EOSINOPHIL NFR BLD AUTO: 2.5 %
ESTIMATED AVERAGE GLUCOSE: 128 MG/DL
HBA1C MFR BLD HPLC: 6.1 %
HBV SURFACE AG SER QL: NONREACTIVE
HCT VFR BLD CALC: 36.9 %
HCV AB SER QL: NONREACTIVE
HCV S/CO RATIO: 0.13 S/CO
HGB BLD-MCNC: 11.2 G/DL
HIV1+2 AB SPEC QL IA.RAPID: NONREACTIVE
IMM GRANULOCYTES NFR BLD AUTO: 0.3 %
LYMPHOCYTES # BLD AUTO: 2.28 K/UL
LYMPHOCYTES NFR BLD AUTO: 33.2 %
MAN DIFF?: NORMAL
MCHC RBC-ENTMCNC: 27.7 PG
MCHC RBC-ENTMCNC: 30.4 GM/DL
MCV RBC AUTO: 91.1 FL
MEV IGG FLD QL IA: 101 AU/ML
MEV IGG+IGM SER-IMP: POSITIVE
MONOCYTES # BLD AUTO: 0.68 K/UL
MONOCYTES NFR BLD AUTO: 9.9 %
N GONORRHOEA RRNA SPEC QL NAA+PROBE: NOT DETECTED
NEUTROPHILS # BLD AUTO: 3.7 K/UL
NEUTROPHILS NFR BLD AUTO: 53.8 %
PLATELET # BLD AUTO: 349 K/UL
RBC # BLD: 4.05 M/UL
RBC # FLD: 12.4 %
RUBV IGG FLD-ACNC: 3.3 INDEX
RUBV IGG SER-IMP: POSITIVE
SOURCE AMPLIFICATION: NORMAL
T PALLIDUM AB SER QL IA: NEGATIVE
VZV AB TITR SER: POSITIVE
VZV IGG SER IF-ACNC: 1839 INDEX
WBC # FLD AUTO: 6.87 K/UL

## 2022-01-25 ENCOUNTER — TRANSCRIPTION ENCOUNTER (OUTPATIENT)
Age: 32
End: 2022-01-25

## 2022-02-28 ENCOUNTER — OUTPATIENT (OUTPATIENT)
Dept: OUTPATIENT SERVICES | Facility: HOSPITAL | Age: 32
LOS: 1 days | End: 2022-02-28

## 2022-02-28 VITALS
WEIGHT: 222.01 LBS | OXYGEN SATURATION: 99 % | HEIGHT: 66 IN | HEART RATE: 78 BPM | TEMPERATURE: 97 F | RESPIRATION RATE: 16 BRPM | SYSTOLIC BLOOD PRESSURE: 110 MMHG | DIASTOLIC BLOOD PRESSURE: 72 MMHG

## 2022-02-28 DIAGNOSIS — Z98.84 BARIATRIC SURGERY STATUS: Chronic | ICD-10-CM

## 2022-02-28 DIAGNOSIS — Z98.89 OTHER SPECIFIED POSTPROCEDURAL STATES: Chronic | ICD-10-CM

## 2022-02-28 DIAGNOSIS — K21.9 GASTRO-ESOPHAGEAL REFLUX DISEASE WITHOUT ESOPHAGITIS: Chronic | ICD-10-CM

## 2022-02-28 DIAGNOSIS — O34.30 MATERNAL CARE FOR CERVICAL INCOMPETENCE, UNSPECIFIED TRIMESTER: ICD-10-CM

## 2022-02-28 LAB
BLD GP AB SCN SERPL QL: NEGATIVE — SIGNIFICANT CHANGE UP
HCT VFR BLD CALC: 35.5 % — SIGNIFICANT CHANGE UP (ref 34.5–45)
HGB BLD-MCNC: 11 G/DL — LOW (ref 11.5–15.5)
MCHC RBC-ENTMCNC: 27.6 PG — SIGNIFICANT CHANGE UP (ref 27–34)
MCHC RBC-ENTMCNC: 31 GM/DL — LOW (ref 32–36)
MCV RBC AUTO: 89 FL — SIGNIFICANT CHANGE UP (ref 80–100)
NRBC # BLD: 0 /100 WBCS — SIGNIFICANT CHANGE UP
NRBC # FLD: 0 K/UL — SIGNIFICANT CHANGE UP
PLATELET # BLD AUTO: 289 K/UL — SIGNIFICANT CHANGE UP (ref 150–400)
RBC # BLD: 3.99 M/UL — SIGNIFICANT CHANGE UP (ref 3.8–5.2)
RBC # FLD: 12.4 % — SIGNIFICANT CHANGE UP (ref 10.3–14.5)
RH IG SCN BLD-IMP: POSITIVE — SIGNIFICANT CHANGE UP
WBC # BLD: 8.15 K/UL — SIGNIFICANT CHANGE UP (ref 3.8–10.5)
WBC # FLD AUTO: 8.15 K/UL — SIGNIFICANT CHANGE UP (ref 3.8–10.5)

## 2022-02-28 RX ORDER — IBUPROFEN 200 MG
3 TABLET ORAL
Qty: 0 | Refills: 0 | DISCHARGE

## 2022-02-28 RX ORDER — SODIUM CHLORIDE 9 MG/ML
1000 INJECTION, SOLUTION INTRAVENOUS
Refills: 0 | Status: DISCONTINUED | OUTPATIENT
Start: 2022-03-10 | End: 2022-03-24

## 2022-02-28 RX ORDER — FLUCONAZOLE 150 MG/1
1 TABLET ORAL
Qty: 0 | Refills: 0 | DISCHARGE

## 2022-02-28 RX ORDER — OMEPRAZOLE 10 MG/1
1 CAPSULE, DELAYED RELEASE ORAL
Qty: 0 | Refills: 0 | DISCHARGE

## 2022-02-28 NOTE — H&P PST ADULT - NSICDXPASTSURGICALHX_GEN_ALL_CORE_FT
PAST SURGICAL HISTORY:  Chronic GERD     History of D&C 2015    S/P bariatric surgery 2012 Gastric sleeve placement ( @ [facility])

## 2022-02-28 NOTE — H&P PST ADULT - PROBLEM SELECTOR PLAN 1
Schedule for cerclage placement tentatively on 03/10/2022. Pre op instructions given and explained. Pt verbalized understanding.

## 2022-02-28 NOTE — H&P PST ADULT - TEMPERATURE IN FAHRENHEIT (DEGREES F)
Impression: Dry eye syndrome of bilateral lacrimal glands: H04.123. Plan: Patient notes light sensitivity. Exam demonstrates PEE. Discussed R/B/A of ATs, PFATs, Restasis, vs punctal plugs.  Rec ATs Detail Level: Detailed Detail Level: Simple 97.1

## 2022-02-28 NOTE — H&P PST ADULT - HISTORY OF PRESENT ILLNESS
32 y/o female  with PMH:  s/p (): Gastric Sleeve for Morbid Obesity: Current BMI 30.0,  curent: occasional Cigar smoking,  s/p ( 2018) chemical  to terminate IUP approximately 7 weeks. Followup evaluation dx: failed Medical . Scheduled: Suction Curettage for IUP/ Insertion of I.U.D.  32 y/o female 12 weeks pregnant, LMP 12/06/2021, KEVIN 09/12/2022 presents to PST for pre op evaluation in preparation for cerclage placement

## 2022-02-28 NOTE — H&P PST ADULT - NSICDXPASTMEDICALHX_GEN_ALL_CORE_FT
PAST MEDICAL HISTORY:  Current smoker ocassional Cigars    GERD (gastroesophageal reflux disease)     Incompetent cervix in pregnancy     Missed  multiple    Sleep apnea was using CPAP machine prior to bariatric surgery in . Now resolved

## 2022-02-28 NOTE — H&P PST ADULT - NSANTHOSAYNRD_GEN_A_CORE
Neck 14 in./No. OJNG screening performed.  STOP BANG Legend: 0-2 = LOW Risk; 3-4 = INTERMEDIATE Risk; 5-8 = HIGH Risk

## 2022-03-02 ENCOUNTER — APPOINTMENT (OUTPATIENT)
Dept: ANTEPARTUM | Facility: CLINIC | Age: 32
End: 2022-03-02
Payer: COMMERCIAL

## 2022-03-02 ENCOUNTER — LABORATORY RESULT (OUTPATIENT)
Age: 32
End: 2022-03-02

## 2022-03-02 ENCOUNTER — APPOINTMENT (OUTPATIENT)
Dept: OBGYN | Facility: CLINIC | Age: 32
End: 2022-03-02
Payer: COMMERCIAL

## 2022-03-02 ENCOUNTER — ASOB RESULT (OUTPATIENT)
Age: 32
End: 2022-03-02

## 2022-03-02 VITALS
HEART RATE: 85 BPM | SYSTOLIC BLOOD PRESSURE: 116 MMHG | DIASTOLIC BLOOD PRESSURE: 71 MMHG | HEIGHT: 66 IN | WEIGHT: 222 LBS | BODY MASS INDEX: 35.68 KG/M2

## 2022-03-02 DIAGNOSIS — N88.3 INCOMPETENCE OF CERVIX UTERI: ICD-10-CM

## 2022-03-02 PROCEDURE — 76801 OB US < 14 WKS SINGLE FETUS: CPT

## 2022-03-02 PROCEDURE — 36416 COLLJ CAPILLARY BLOOD SPEC: CPT

## 2022-03-02 PROCEDURE — 0501F PRENATAL FLOW SHEET: CPT

## 2022-03-02 PROCEDURE — 76813 OB US NUCHAL MEAS 1 GEST: CPT

## 2022-03-07 ENCOUNTER — APPOINTMENT (OUTPATIENT)
Dept: OBGYN | Facility: CLINIC | Age: 32
End: 2022-03-07

## 2022-03-07 DIAGNOSIS — Z01.818 ENCOUNTER FOR OTHER PREPROCEDURAL EXAMINATION: ICD-10-CM

## 2022-03-07 RX ORDER — PANTOPRAZOLE 40 MG/1
40 TABLET, DELAYED RELEASE ORAL
Qty: 90 | Refills: 1 | Status: DISCONTINUED | COMMUNITY
Start: 2021-03-15 | End: 2022-03-07

## 2022-03-08 LAB — SARS-COV-2 N GENE NPH QL NAA+PROBE: NOT DETECTED

## 2022-03-09 ENCOUNTER — TRANSCRIPTION ENCOUNTER (OUTPATIENT)
Age: 32
End: 2022-03-09

## 2022-03-09 NOTE — ASU PATIENT PROFILE, ADULT - FALL HARM RISK - UNIVERSAL INTERVENTIONS
Bed in lowest position, wheels locked, appropriate side rails in place/Call bell, personal items and telephone in reach/Instruct patient to call for assistance before getting out of bed or chair/Non-slip footwear when patient is out of bed/[place] to call system/Physically safe environment - no spills, clutter or unnecessary equipment/Purposeful Proactive Rounding/Room/bathroom lighting operational, light cord in reach

## 2022-03-09 NOTE — ASU PATIENT PROFILE, ADULT - NSICDXPASTSURGICALHX_GEN_ALL_CORE_FT
PAST SURGICAL HISTORY:  Chronic GERD     History of D&C 2015    S/P bariatric surgery 2012 Gastric sleeve placement ( @ [facility])

## 2022-03-10 ENCOUNTER — APPOINTMENT (OUTPATIENT)
Dept: OBGYN | Facility: HOSPITAL | Age: 32
End: 2022-03-10

## 2022-03-10 ENCOUNTER — OUTPATIENT (OUTPATIENT)
Dept: OUTPATIENT SERVICES | Facility: HOSPITAL | Age: 32
LOS: 1 days | Discharge: HOME | End: 2022-03-10
Payer: COMMERCIAL

## 2022-03-10 VITALS
OXYGEN SATURATION: 97 % | HEART RATE: 74 BPM | DIASTOLIC BLOOD PRESSURE: 65 MMHG | RESPIRATION RATE: 15 BRPM | SYSTOLIC BLOOD PRESSURE: 114 MMHG

## 2022-03-10 VITALS
HEART RATE: 62 BPM | WEIGHT: 222.01 LBS | HEIGHT: 66 IN | DIASTOLIC BLOOD PRESSURE: 64 MMHG | OXYGEN SATURATION: 100 % | TEMPERATURE: 99 F | RESPIRATION RATE: 15 BRPM | SYSTOLIC BLOOD PRESSURE: 114 MMHG

## 2022-03-10 DIAGNOSIS — Z98.89 OTHER SPECIFIED POSTPROCEDURAL STATES: Chronic | ICD-10-CM

## 2022-03-10 DIAGNOSIS — O34.30 MATERNAL CARE FOR CERVICAL INCOMPETENCE, UNSPECIFIED TRIMESTER: ICD-10-CM

## 2022-03-10 DIAGNOSIS — Z98.84 BARIATRIC SURGERY STATUS: Chronic | ICD-10-CM

## 2022-03-10 DIAGNOSIS — K21.9 GASTRO-ESOPHAGEAL REFLUX DISEASE WITHOUT ESOPHAGITIS: Chronic | ICD-10-CM

## 2022-03-10 PROCEDURE — 59320 REVISION OF CERVIX: CPT | Mod: GC

## 2022-03-10 RX ORDER — SODIUM CHLORIDE 9 MG/ML
1000 INJECTION, SOLUTION INTRAVENOUS
Refills: 0 | Status: DISCONTINUED | OUTPATIENT
Start: 2022-03-10 | End: 2022-03-24

## 2022-03-10 RX ORDER — INDOMETHACIN 50 MG
1 CAPSULE ORAL
Qty: 8 | Refills: 0
Start: 2022-03-10 | End: 2022-03-11

## 2022-03-10 RX ORDER — INDOMETHACIN 50 MG
50 CAPSULE ORAL ONCE
Refills: 0 | Status: COMPLETED | OUTPATIENT
Start: 2022-03-10 | End: 2022-03-10

## 2022-03-10 RX ORDER — ACETAMINOPHEN 500 MG
1000 TABLET ORAL ONCE
Refills: 0 | Status: COMPLETED | OUTPATIENT
Start: 2022-03-10 | End: 2022-03-10

## 2022-03-10 RX ADMIN — Medication 400 MILLIGRAM(S): at 16:54

## 2022-03-10 RX ADMIN — Medication 50 MILLIGRAM(S): at 13:58

## 2022-03-10 RX ADMIN — Medication 50 MILLIGRAM(S): at 14:30

## 2022-03-10 RX ADMIN — SODIUM CHLORIDE 125 MILLILITER(S): 9 INJECTION, SOLUTION INTRAVENOUS at 13:23

## 2022-03-10 NOTE — ASU DISCHARGE PLAN (ADULT/PEDIATRIC) - CALL YOUR DOCTOR IF YOU HAVE ANY OF THE FOLLOWING:
Bleeding that does not stop/Swelling that gets worse/Pain not relieved by Medications/Fever greater than (need to indicate Fahrenheit or Celsius)/Wound/Surgical Site with redness, or foul smelling discharge or pus/Nausea and vomiting that does not stop/Unable to urinate/Excessive diarrhea

## 2022-03-10 NOTE — CHART NOTE - NSCHARTNOTEFT_GEN_A_CORE
Pt seen at bedside in PACU s/p cerclage. Doing well post op.  +FH on ultrasound: 143 bpm  Garcia to be removed when sensation in LE intact, then DTV and home.     RONNELL Flores PGY1

## 2022-03-10 NOTE — ASU DISCHARGE PLAN (ADULT/PEDIATRIC) - CARE PROVIDER_API CALL
Robert Overton (MD)  Obstetrics and Gynecology  [address], [place] [address]  Phone: [phone]  Fax: [phone]  Follow Up Time: 2 weeks

## 2022-03-10 NOTE — BRIEF OPERATIVE NOTE - NSICDXBRIEFPROCEDURE_GEN_ALL_CORE_FT
PROCEDURES:  Alem cerclage of cervix during pregnancy by vaginal approach 10-Mar-2022 13:24:41  Jyothi Brush

## 2022-03-10 NOTE — ASU DISCHARGE PLAN (ADULT/PEDIATRIC) - ASU DC SPECIAL INSTRUCTIONSFT
resume normal prenatal care. Take Indocin every 6 hours for 48 hours to help with cramping. Take Tylenol as needed as well every 6 hours. Call your doctor if you experience vaginal bleeding, leakage of fluid, inability to urinate, fevers, chills, severe abdominal pain, nausea, vomiting, fevers, chills.

## 2022-03-16 ENCOUNTER — NON-APPOINTMENT (OUTPATIENT)
Age: 32
End: 2022-03-16

## 2022-03-30 ENCOUNTER — ASOB RESULT (OUTPATIENT)
Age: 32
End: 2022-03-30

## 2022-03-30 ENCOUNTER — APPOINTMENT (OUTPATIENT)
Dept: ANTEPARTUM | Facility: CLINIC | Age: 32
End: 2022-03-30
Payer: COMMERCIAL

## 2022-03-30 PROCEDURE — 76817 TRANSVAGINAL US OBSTETRIC: CPT

## 2022-03-30 PROCEDURE — 76815 OB US LIMITED FETUS(S): CPT

## 2022-04-01 ENCOUNTER — APPOINTMENT (OUTPATIENT)
Dept: OBGYN | Facility: CLINIC | Age: 32
End: 2022-04-01
Payer: COMMERCIAL

## 2022-04-01 VITALS
DIASTOLIC BLOOD PRESSURE: 74 MMHG | WEIGHT: 224 LBS | BODY MASS INDEX: 36 KG/M2 | HEART RATE: 101 BPM | HEIGHT: 66 IN | SYSTOLIC BLOOD PRESSURE: 129 MMHG

## 2022-04-01 PROCEDURE — 0502F SUBSEQUENT PRENATAL CARE: CPT

## 2022-04-04 LAB
2ND TRIMESTER DATA: NORMAL
AFP PNL SERPL: NORMAL
AFP SERPL-ACNC: NORMAL
CLINICAL BIOCHEMIST REVIEW: NORMAL
NOTES NTD: NORMAL

## 2022-04-26 ENCOUNTER — LABORATORY RESULT (OUTPATIENT)
Age: 32
End: 2022-04-26

## 2022-04-26 ENCOUNTER — APPOINTMENT (OUTPATIENT)
Dept: ANTEPARTUM | Facility: CLINIC | Age: 32
End: 2022-04-26

## 2022-04-26 ENCOUNTER — APPOINTMENT (OUTPATIENT)
Dept: OBGYN | Facility: CLINIC | Age: 32
End: 2022-04-26
Payer: COMMERCIAL

## 2022-04-26 VITALS
BODY MASS INDEX: 36.96 KG/M2 | SYSTOLIC BLOOD PRESSURE: 123 MMHG | HEIGHT: 66 IN | WEIGHT: 230 LBS | DIASTOLIC BLOOD PRESSURE: 78 MMHG

## 2022-04-26 PROCEDURE — 0502F SUBSEQUENT PRENATAL CARE: CPT

## 2022-04-27 ENCOUNTER — APPOINTMENT (OUTPATIENT)
Dept: GASTROENTEROLOGY | Facility: CLINIC | Age: 32
End: 2022-04-27
Payer: COMMERCIAL

## 2022-04-27 VITALS
TEMPERATURE: 97.7 F | HEIGHT: 66 IN | WEIGHT: 231 LBS | SYSTOLIC BLOOD PRESSURE: 126 MMHG | HEART RATE: 98 BPM | BODY MASS INDEX: 37.12 KG/M2 | DIASTOLIC BLOOD PRESSURE: 66 MMHG

## 2022-04-27 PROCEDURE — 99214 OFFICE O/P EST MOD 30 MIN: CPT

## 2022-04-27 NOTE — HISTORY OF PRESENT ILLNESS
[FreeTextEntry1] : 32 yo female with chronic gerd, omeprazole does not relieve symptoms, but pantoprazole does relieve symptoms, no dysphagia, pt currently pregnant. normal bms, no brbpr, no melena\par \par s/p egd in 2020 gastric sleeve, no h.pylori,\par \par s/p colonoscopy internal hemorrhoids\par

## 2022-04-27 NOTE — PHYSICAL EXAM
[General Appearance - Alert] : alert [General Appearance - In No Acute Distress] : in no acute distress [General Appearance - Well Nourished] : well nourished [General Appearance - Well Developed] : well developed [Sclera] : the sclera and conjunctiva were normal [Hearing Threshold Finger Rub Not [place]] : hearing was normal [Respiration, Rhythm And Depth] : normal respiratory rhythm and effort [Auscultation Breath Sounds / Voice Sounds] : lungs were clear to auscultation bilaterally [Heart Sounds] : normal S1 and S2 [Bowel Sounds] : normal bowel sounds [Abdomen Soft] : soft [Abdomen Tenderness] : non-tender [FreeTextEntry1] : gravid [No CVA Tenderness] : no ~M costovertebral angle tenderness [] : no rash [Skin Lesions] : no skin lesions [Sensation] : the sensory exam was normal to light touch and pinprick [No Focal Deficits] : no focal deficits [Oriented To Time, Place, And Person] : oriented to person, place, and time

## 2022-04-27 NOTE — REVIEW OF SYSTEMS
[Fever] : no fever [Chills] : no chills [Eyesight Problems] : no eyesight problems [Discharge From Eyes] : no purulent discharge from the eyes [Nosebleeds] : no nosebleeds [Nasal Discharge] : no nasal discharge [Cough] : no cough [SOB on Exertion] : no shortness of breath during exertion [As Noted in HPI] : as noted in HPI [Limb Pain] : no limb pain [Limb Swelling] : no limb swelling [Itching] : no itching [Change In A Mole] : no change in a mole [Dizziness] : no dizziness [Fainting] : no fainting [Swollen Glands] : no swollen glands [Swollen Glands In The Neck] : no swollen glands in the neck

## 2022-04-29 ENCOUNTER — ASOB RESULT (OUTPATIENT)
Age: 32
End: 2022-04-29

## 2022-04-29 ENCOUNTER — APPOINTMENT (OUTPATIENT)
Dept: ANTEPARTUM | Facility: CLINIC | Age: 32
End: 2022-04-29
Payer: COMMERCIAL

## 2022-04-29 LAB — ZIKA PCR SERUM: NOT DETECTED

## 2022-04-29 PROCEDURE — 76811 OB US DETAILED SNGL FETUS: CPT

## 2022-04-29 PROCEDURE — 76817 TRANSVAGINAL US OBSTETRIC: CPT

## 2022-04-29 NOTE — ASU PATIENT PROFILE, ADULT - NS SC CAGE ALCOHOL CUT DOWN
[FreeTextEntry1] : This is a 60 year old male who presented with a high PSA of 129 in 2020. In August of 2021 patient underwent prostate biopsy which revealed [place] 3+4=7 in 6/12 cores. Follow up CT scan revealed no lymphadenopathy and Bone scan showed a possible old trauma in left anterior third rib, otherwise no specific suggestion of metastasis. \par \par Patient is s/p radiation therapy 7020cGy to the prostate/pelvis from 12/06/2021-01/12/2021 with Dr. Trammell. PSA 02/18/2022 revealed PSA of 23.9 ng/mL which is improved from his PSA at time of biopsy 08/2021 which was 138.00 ng/mL \par \par Patient states that he feels well. He denies dysuria, gross hematuria, and bowel issues. He states that his urinary symptoms are stable.  no

## 2022-05-12 ENCOUNTER — ASOB RESULT (OUTPATIENT)
Age: 32
End: 2022-05-12

## 2022-05-12 ENCOUNTER — APPOINTMENT (OUTPATIENT)
Dept: ANTEPARTUM | Facility: CLINIC | Age: 32
End: 2022-05-12
Payer: COMMERCIAL

## 2022-05-12 PROCEDURE — 76816 OB US FOLLOW-UP PER FETUS: CPT

## 2022-05-17 ENCOUNTER — NON-APPOINTMENT (OUTPATIENT)
Age: 32
End: 2022-05-17

## 2022-05-24 ENCOUNTER — APPOINTMENT (OUTPATIENT)
Dept: OBGYN | Facility: CLINIC | Age: 32
End: 2022-05-24
Payer: COMMERCIAL

## 2022-05-24 VITALS
HEART RATE: 98 BPM | BODY MASS INDEX: 37.97 KG/M2 | SYSTOLIC BLOOD PRESSURE: 102 MMHG | HEIGHT: 66 IN | WEIGHT: 236.3 LBS | DIASTOLIC BLOOD PRESSURE: 69 MMHG

## 2022-05-24 DIAGNOSIS — Z98.891 HISTORY OF UTERINE SCAR FROM PREVIOUS SURGERY: ICD-10-CM

## 2022-05-24 PROCEDURE — 0502F SUBSEQUENT PRENATAL CARE: CPT

## 2022-06-06 ENCOUNTER — RX RENEWAL (OUTPATIENT)
Age: 32
End: 2022-06-06

## 2022-06-17 ENCOUNTER — OUTPATIENT (OUTPATIENT)
Dept: INPATIENT UNIT | Facility: HOSPITAL | Age: 32
LOS: 1 days | Discharge: HOME | End: 2022-06-17
Payer: COMMERCIAL

## 2022-06-17 VITALS — HEART RATE: 88 BPM | SYSTOLIC BLOOD PRESSURE: 110 MMHG | DIASTOLIC BLOOD PRESSURE: 57 MMHG

## 2022-06-17 VITALS
SYSTOLIC BLOOD PRESSURE: 113 MMHG | HEART RATE: 97 BPM | TEMPERATURE: 99 F | DIASTOLIC BLOOD PRESSURE: 667 MMHG | RESPIRATION RATE: 16 BRPM

## 2022-06-17 DIAGNOSIS — Z98.84 BARIATRIC SURGERY STATUS: Chronic | ICD-10-CM

## 2022-06-17 DIAGNOSIS — K21.9 GASTRO-ESOPHAGEAL REFLUX DISEASE WITHOUT ESOPHAGITIS: Chronic | ICD-10-CM

## 2022-06-17 DIAGNOSIS — Z3A.00 WEEKS OF GESTATION OF PREGNANCY NOT SPECIFIED: ICD-10-CM

## 2022-06-17 DIAGNOSIS — O26.899 OTHER SPECIFIED PREGNANCY RELATED CONDITIONS, UNSPECIFIED TRIMESTER: ICD-10-CM

## 2022-06-17 DIAGNOSIS — Z98.89 OTHER SPECIFIED POSTPROCEDURAL STATES: Chronic | ICD-10-CM

## 2022-06-17 LAB
APPEARANCE UR: CLEAR — SIGNIFICANT CHANGE UP
BILIRUB UR-MCNC: NEGATIVE — SIGNIFICANT CHANGE UP
COLOR SPEC: COLORLESS — SIGNIFICANT CHANGE UP
DIFF PNL FLD: NEGATIVE — SIGNIFICANT CHANGE UP
GLUCOSE UR QL: NEGATIVE — SIGNIFICANT CHANGE UP
KETONES UR-MCNC: NEGATIVE — SIGNIFICANT CHANGE UP
LEUKOCYTE ESTERASE UR-ACNC: NEGATIVE — SIGNIFICANT CHANGE UP
NITRITE UR-MCNC: NEGATIVE — SIGNIFICANT CHANGE UP
PH UR: 7.5 — SIGNIFICANT CHANGE UP (ref 5–8)
PROT UR-MCNC: NEGATIVE — SIGNIFICANT CHANGE UP
SP GR SPEC: 1 — SIGNIFICANT CHANGE UP (ref 1–1.05)
UROBILINOGEN FLD QL: SIGNIFICANT CHANGE UP

## 2022-06-17 PROCEDURE — 76818 FETAL BIOPHYS PROFILE W/NST: CPT | Mod: 26

## 2022-06-17 PROCEDURE — 76817 TRANSVAGINAL US OBSTETRIC: CPT | Mod: 26

## 2022-06-17 PROCEDURE — 99214 OFFICE O/P EST MOD 30 MIN: CPT | Mod: 25

## 2022-06-17 RX ORDER — ACETAMINOPHEN 500 MG
2 TABLET ORAL
Qty: 0 | Refills: 0 | DISCHARGE

## 2022-06-17 NOTE — OB PROVIDER TRIAGE NOTE - HISTORY OF PRESENT ILLNESS
31 y.o.  KEVIN 2022 @ 27.4 weeks presents with c/o lower abdominal cramping q 5-10 mins since 730a, 6/10 pain, now lower abdominal "tightening" 4/10 pain. Pt denies LOF, VB, dysuria, fever, chills, sick contacts, recent sexual intercourse.  31 y.o.  KEVIN 2022 @ 27.4 weeks presents with c/o lower abdominal cramping q 5-10 mins since 730a, 6/10 pain, now lower abdominal "tightening" 4/10 pain. Pt denies LOF, VB, dysuria, fever, chills, sick contacts, recent sexual intercourse. Pt states normal BM x 1 this AM followed by 2 episodes loose stool. Antepartum course complicated by recurrent first trimester sab, cervical  insufficiency - prophylactic Ferrara cerclage insitu- 2 stitches.     pt states hx +covid 19 2022. Pt is vaccinated x 2 against covid 19.    allergies:  NKDA  medications:  prenatal vitamins  pantoprazole 40 mg PO daily    med/surg hx:  GERD - pantoprazole 40 mg PO daily - followed by Dr Festus Dale, GI, last appointment 3/2022  2012 gastric sleeve  cs x 1  d+c x 3  liopsuction      OBGYN hx:  2007 23 week PTL -  - PPH transfused with 2 units PRBCs - patients states incidental finding of no measurable cx on level 2 ultrasound. Pt states she was counseled on rescue cerclage placement however patient opted to go home and return with her mother. Pt then went into PTL,  passed away at 4 hours of life.    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  KEVIN 2022 @ 27.4 weeks presents with c/o lower abdominal cramping q 5-10 mins since 730a, 6/10 pain, now lower abdominal "tightening" 4/10 pain. Pt denies LOF, VB, dysuria, fever, chills, sick contacts, recent sexual intercourse. Pt states normal BM x 1 this AM followed by 2 episodes loose stool. Antepartum course complicated by recurrent first trimester sab, cervical  insufficiency - prophylactic Ferrara cerclage insitu- 2 stitches.     pt states hx +covid 19 2022. Pt is vaccinated x 2 against covid 19.    allergies:  NKDA  medications:  prenatal vitamins  pantoprazole 40 mg PO daily    med/surg hx:  GERD - pantoprazole 40 mg PO daily - followed by Dr Festus Dale, GI, last appointment 3/2022  2012 gastric sleeve  cs x 1  d+c x 3  liopsuction      OBGYN hx:  2007 23 week PTL -  - PPH transfused with 2 units PRBCs - patients states incidental finding of no measurable cx on level 2 ultrasound. Pt states she was counseled on rescue cerclage placement however patient opted to go home and return with her mother. Pt then went into PTL,  passed away at 4 hours of life.    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

## 2022-06-17 NOTE — OB RN TRIAGE NOTE - MENTAL HEALTH CONDITIONS/SYMPTOMS, PROFILE
none anxiety disorder anxiety and depression regarding pregnancy miscarriage history and loss/anxiety disorder/depression

## 2022-06-17 NOTE — OB RN TRIAGE NOTE - NSICDXPASTSURGICALHX_GEN_ALL_CORE_FT
PAST SURGICAL HISTORY:  Chronic GERD     History of D&C 2015    S/P bariatric surgery 2012 Gastric sleeve placement ( @ [facility])

## 2022-06-17 NOTE — OB RN TRIAGE NOTE - FALL HARM RISK - UNIVERSAL INTERVENTIONS
Bed in lowest position, wheels locked, appropriate side rails in place/Call bell, personal items and telephone in reach/Instruct patient to call for assistance before getting out of bed or chair/Non-slip footwear when patient is out of bed/[place] to call system/Physically safe environment - no spills, clutter or unnecessary equipment/Purposeful Proactive Rounding/Room/bathroom lighting operational, light cord in reach

## 2022-06-17 NOTE — OB PROVIDER TRIAGE NOTE - NSICDXPASTSURGICALHX_GEN_ALL_CORE_FT
PAST SURGICAL HISTORY:  Chronic GERD     History of D&C 2015    S/P bariatric surgery 2012 Gastric sleeve placement ( @ [facility])

## 2022-06-17 NOTE — OB RN TRIAGE NOTE - CURRENT PREGNANCY COMPLICATIONS, OB PROFILE
Incompetent Cervix/Cervical Insufficiency Incompetent Cervix/Cervical Insufficiency/ Labor/Gestational Age less than 36 Weeks/Other

## 2022-06-17 NOTE — OB PROVIDER TRIAGE NOTE - ATTENDING COMMENTS
31 y.o.  KEVIN 2022 @ 27.4 weeks presents with lower abdominal pain. Otherwise denies VB, LOF and reports good FM.   H/o significant for cervical insufficiency, patient has Ferrara cerclage in place.  NST cat 1 with no contractions on TOCO. Cervix closed, cerclage not on tension. UA negative.  Patient feels better.   Stable for d/c home with precautions.     Alex Aleman MD

## 2022-06-17 NOTE — OB PROVIDER TRIAGE NOTE - NSHPLABSRESULTS_GEN_ALL_CORE
Urinalysis Basic - ( 2022 12:47 )    Color: Colorless / Appearance: Clear / S.005 / pH: x  Gluc: x / Ketone: Negative  / Bili: Negative / Urobili: <2 mg/dL   Blood: x / Protein: Negative / Nitrite: Negative   Leuk Esterase: Negative / RBC: x / WBC x   Sq Epi: x / Non Sq Epi: x / Bacteria: x

## 2022-06-17 NOTE — OB PROVIDER TRIAGE NOTE - NSHPPHYSICALEXAM_GEN_ALL_CORE
abdomen soft, nontender  taus: sliup, breech presentation, posterior placenta, bpp 8/8, mauricio 24.4, [id number] grams 55.6 %ile  sse: no lesions noted, cerclage sutures visualized @ 10 o'clock, cervix appears closed, moderate leukorrhea  sve: no tension palpated on stitch, 0/0/-3/I  tvus: cervical length 3.9, no dynamic changes/funneling noted  nst wnl  toco: uterine irritability noted

## 2022-06-17 NOTE — OB PROVIDER TRIAGE NOTE - NSOBPROVIDERNOTE_OBGYN_ALL_OB_FT
a/p: 31 y.o.  KEVIN 2022 @ 27.4 weeks r/o PTL    UA negative, ucx pending  no evidence of acute process    Arleth NP a/p: 31 y.o.  KEVIN 2022 @ 27.4 weeks r/o PTL    UA negative, ucx pending  no evidence of acute process    1342  Discussed with Dr Ash Reinoso, NP a/p: 31 y.o.  KEVIN 2022 @ 27.4 weeks r/o PTL    UA negative, ucx pending  no evidence of acute process    1342  Discussed with Dr Aleman    [id number]  Dr Aleman at bedside speaking with patient. Plan for discharge home with  labor precautions/fetal kick counts reviewed. Encouraged increased PO hydration. Pt to follow up next scheduled prenatal appointment 2022.    Arleth, NP

## 2022-06-17 NOTE — OB PROVIDER TRIAGE NOTE - NS_OBGYNHISTORY_OBGYN_ALL_OB_FT
2016 c/s [facility]  top x 2   5sabs 2d&c  23weelk delivery baby passed 4 hours later 2007 23 week PTL -  - PPH transfused with 2 units PRBCs - patients states incidental finding of no measurable cx on level 2 ultrasound. Pt states she was counseled on rescue cerclage placement however patient opted to go home and return with her mother. Pt then went into PTL,  passed away at 4 hours of life.    10/26/2026 pLTCS 2/2 NRFHT 7lbs 13 oz (M) prophylactic cerclage    ETOP x 3    recurrent sab x 5 - patient states genetic workup negative, attributed to cervical insufficiency     d+c x 3    HSV2- outbreak in 2016 during pregnancy and  - no meds currently    fibroid uterus:   anterior   right 4.4 x 4.0 x 3.7 intramural  posterior right 1.8 x 1.7 x 1.5 intramural 2007 23 week PTL -  - PPH transfused with 2 units PRBCs - patients states incidental finding of no measurable cx on level 2 ultrasound. Pt states she was counseled on rescue cerclage placement however patient opted to go home and return with her mother. Pt then went into PTL,  passed away at 4 hours of life.    10/26/2016 pLTCS 2/2 NRFHT 7lbs 13 oz (M) prophylactic cerclage    ETOP x 3    recurrent sab x 5 - patient states genetic workup negative, attributed to cervical insufficiency     d+c x 3    HSV2- outbreak in 2016 during pregnancy and  - no meds currently    fibroid uterus:   anterior   right 4.4 x 4.0 x 3.7 intramural  posterior right 1.8 x 1.7 x 1.5 intramural

## 2022-06-17 NOTE — OB RN TRIAGE NOTE - NS_OBGYNHISTORY_OBGYN_ALL_OB_FT
2016 c/s [facility]  top x 2   5sabs 2d&c  23weelk delivery baby passed 4 hours later

## 2022-06-17 NOTE — OB PROVIDER TRIAGE NOTE - ADDITIONAL INSTRUCTIONS
Please drink 1-2 liters of fluid per day. Please go to your next scheduled prenatal appointment on 6/21/2022.

## 2022-06-18 LAB
CULTURE RESULTS: SIGNIFICANT CHANGE UP
SPECIMEN SOURCE: SIGNIFICANT CHANGE UP

## 2022-06-21 ENCOUNTER — APPOINTMENT (OUTPATIENT)
Dept: OBGYN | Facility: CLINIC | Age: 32
End: 2022-06-21
Payer: COMMERCIAL

## 2022-06-21 ENCOUNTER — APPOINTMENT (OUTPATIENT)
Dept: ANTEPARTUM | Facility: CLINIC | Age: 32
End: 2022-06-21

## 2022-06-21 VITALS
HEART RATE: 89 BPM | DIASTOLIC BLOOD PRESSURE: 81 MMHG | BODY MASS INDEX: 38.25 KG/M2 | HEIGHT: 66 IN | WEIGHT: 238 LBS | SYSTOLIC BLOOD PRESSURE: 121 MMHG

## 2022-06-21 DIAGNOSIS — O34.30 MATERNAL CARE FOR CERVICAL INCOMPETENCE, UNSPECIFIED TRIMESTER: ICD-10-CM

## 2022-06-21 LAB
BASOPHILS # BLD AUTO: 0.03 K/UL
BASOPHILS NFR BLD AUTO: 0.3 %
COVID-19 SPIKE DOMAIN ANTIBODY INTERPRETATION: POSITIVE
EOSINOPHIL # BLD AUTO: 0.23 K/UL
EOSINOPHIL NFR BLD AUTO: 2.7 %
HCT VFR BLD CALC: 36 %
HGB BLD-MCNC: 11 G/DL
HIV1+2 AB SPEC QL IA.RAPID: NONREACTIVE
IMM GRANULOCYTES NFR BLD AUTO: 0.9 %
LYMPHOCYTES # BLD AUTO: 1.98 K/UL
LYMPHOCYTES NFR BLD AUTO: 23 %
MAN DIFF?: NORMAL
MCHC RBC-ENTMCNC: 26.1 PG
MCHC RBC-ENTMCNC: 30.6 GM/DL
MCV RBC AUTO: 85.3 FL
MONOCYTES # BLD AUTO: 0.71 K/UL
MONOCYTES NFR BLD AUTO: 8.3 %
NEUTROPHILS # BLD AUTO: 5.57 K/UL
NEUTROPHILS NFR BLD AUTO: 64.8 %
PLATELET # BLD AUTO: 276 K/UL
RBC # BLD: 4.22 M/UL
RBC # FLD: 12.9 %
SARS-COV-2 AB SERPL IA-ACNC: >250 U/ML
T PALLIDUM AB SER QL IA: NEGATIVE
WBC # FLD AUTO: 8.6 K/UL

## 2022-06-21 PROCEDURE — 0502F SUBSEQUENT PRENATAL CARE: CPT

## 2022-07-05 ENCOUNTER — NON-APPOINTMENT (OUTPATIENT)
Age: 32
End: 2022-07-05

## 2022-07-05 ENCOUNTER — APPOINTMENT (OUTPATIENT)
Dept: OBGYN | Facility: CLINIC | Age: 32
End: 2022-07-05

## 2022-07-05 VITALS
WEIGHT: 242 LBS | BODY MASS INDEX: 38.89 KG/M2 | DIASTOLIC BLOOD PRESSURE: 78 MMHG | SYSTOLIC BLOOD PRESSURE: 125 MMHG | HEIGHT: 66 IN | HEART RATE: 94 BPM

## 2022-07-05 PROCEDURE — 0502F SUBSEQUENT PRENATAL CARE: CPT

## 2022-07-19 ENCOUNTER — APPOINTMENT (OUTPATIENT)
Dept: OBGYN | Facility: CLINIC | Age: 32
End: 2022-07-19

## 2022-07-19 VITALS
SYSTOLIC BLOOD PRESSURE: 128 MMHG | BODY MASS INDEX: 38.41 KG/M2 | HEIGHT: 66 IN | DIASTOLIC BLOOD PRESSURE: 80 MMHG | WEIGHT: 239 LBS

## 2022-07-20 ENCOUNTER — APPOINTMENT (OUTPATIENT)
Dept: ANTEPARTUM | Facility: CLINIC | Age: 32
End: 2022-07-20

## 2022-07-20 ENCOUNTER — ASOB RESULT (OUTPATIENT)
Age: 32
End: 2022-07-20

## 2022-07-20 PROCEDURE — 76819 FETAL BIOPHYS PROFIL W/O NST: CPT

## 2022-07-20 PROCEDURE — 76816 OB US FOLLOW-UP PER FETUS: CPT

## 2022-08-05 ENCOUNTER — APPOINTMENT (OUTPATIENT)
Dept: OBGYN | Facility: CLINIC | Age: 32
End: 2022-08-05

## 2022-08-05 VITALS
HEIGHT: 66 IN | SYSTOLIC BLOOD PRESSURE: 115 MMHG | BODY MASS INDEX: 38.89 KG/M2 | HEART RATE: 84 BPM | WEIGHT: 242 LBS | DIASTOLIC BLOOD PRESSURE: 75 MMHG

## 2022-08-05 PROCEDURE — 0502F SUBSEQUENT PRENATAL CARE: CPT

## 2022-08-17 ENCOUNTER — NON-APPOINTMENT (OUTPATIENT)
Age: 32
End: 2022-08-17

## 2022-08-17 ENCOUNTER — ASOB RESULT (OUTPATIENT)
Age: 32
End: 2022-08-17

## 2022-08-17 ENCOUNTER — APPOINTMENT (OUTPATIENT)
Dept: ANTEPARTUM | Facility: CLINIC | Age: 32
End: 2022-08-17

## 2022-08-17 ENCOUNTER — APPOINTMENT (OUTPATIENT)
Dept: OBGYN | Facility: CLINIC | Age: 32
End: 2022-08-17

## 2022-08-17 VITALS
WEIGHT: 242 LBS | HEIGHT: 66 IN | SYSTOLIC BLOOD PRESSURE: 122 MMHG | BODY MASS INDEX: 38.89 KG/M2 | DIASTOLIC BLOOD PRESSURE: 79 MMHG

## 2022-08-17 PROCEDURE — 76816 OB US FOLLOW-UP PER FETUS: CPT

## 2022-08-17 PROCEDURE — 0502F SUBSEQUENT PRENATAL CARE: CPT

## 2022-08-17 PROCEDURE — 76819 FETAL BIOPHYS PROFIL W/O NST: CPT

## 2022-08-19 LAB — B-HEM STREP SPEC QL CULT: NORMAL

## 2022-08-24 ENCOUNTER — APPOINTMENT (OUTPATIENT)
Dept: OBGYN | Facility: CLINIC | Age: 32
End: 2022-08-24

## 2022-08-24 VITALS
SYSTOLIC BLOOD PRESSURE: 122 MMHG | HEIGHT: 66 IN | WEIGHT: 242 LBS | DIASTOLIC BLOOD PRESSURE: 77 MMHG | BODY MASS INDEX: 38.89 KG/M2

## 2022-08-24 DIAGNOSIS — Z34.90 ENCOUNTER FOR SUPERVISION OF NORMAL PREGNANCY, UNSPECIFIED, UNSPECIFIED TRIMESTER: ICD-10-CM

## 2022-08-24 PROCEDURE — 90715 TDAP VACCINE 7 YRS/> IM: CPT

## 2022-08-24 PROCEDURE — 0502F SUBSEQUENT PRENATAL CARE: CPT

## 2022-08-24 PROCEDURE — 90471 IMMUNIZATION ADMIN: CPT

## 2022-08-25 ENCOUNTER — TRANSCRIPTION ENCOUNTER (OUTPATIENT)
Age: 32
End: 2022-08-25

## 2022-08-26 ENCOUNTER — RESULT REVIEW (OUTPATIENT)
Age: 32
End: 2022-08-26

## 2022-08-26 ENCOUNTER — INPATIENT (INPATIENT)
Facility: HOSPITAL | Age: 32
LOS: 2 days | Discharge: HOME | End: 2022-08-29
Attending: OBSTETRICS & GYNECOLOGY | Admitting: OBSTETRICS & GYNECOLOGY

## 2022-08-26 VITALS
TEMPERATURE: 99 F | DIASTOLIC BLOOD PRESSURE: 55 MMHG | HEART RATE: 91 BPM | RESPIRATION RATE: 20 BRPM | SYSTOLIC BLOOD PRESSURE: 111 MMHG

## 2022-08-26 DIAGNOSIS — Z98.84 BARIATRIC SURGERY STATUS: Chronic | ICD-10-CM

## 2022-08-26 DIAGNOSIS — Z98.89 OTHER SPECIFIED POSTPROCEDURAL STATES: Chronic | ICD-10-CM

## 2022-08-26 DIAGNOSIS — Z3A.00 WEEKS OF GESTATION OF PREGNANCY NOT SPECIFIED: ICD-10-CM

## 2022-08-26 DIAGNOSIS — K21.9 GASTRO-ESOPHAGEAL REFLUX DISEASE WITHOUT ESOPHAGITIS: Chronic | ICD-10-CM

## 2022-08-26 DIAGNOSIS — O26.899 OTHER SPECIFIED PREGNANCY RELATED CONDITIONS, UNSPECIFIED TRIMESTER: ICD-10-CM

## 2022-08-26 LAB
BASOPHILS # BLD AUTO: 0.02 K/UL — SIGNIFICANT CHANGE UP (ref 0–0.2)
BASOPHILS NFR BLD AUTO: 0.3 % — SIGNIFICANT CHANGE UP (ref 0–2)
BLD GP AB SCN SERPL QL: NEGATIVE — SIGNIFICANT CHANGE UP
COVID-19 SPIKE DOMAIN AB INTERP: POSITIVE
COVID-19 SPIKE DOMAIN ANTIBODY RESULT: >250 U/ML — HIGH
EOSINOPHIL # BLD AUTO: 0.07 K/UL — SIGNIFICANT CHANGE UP (ref 0–0.5)
EOSINOPHIL NFR BLD AUTO: 1.1 % — SIGNIFICANT CHANGE UP (ref 0–6)
HCT VFR BLD CALC: 35.4 % — SIGNIFICANT CHANGE UP (ref 34.5–45)
HCT VFR BLD CALC: 38.4 % — SIGNIFICANT CHANGE UP (ref 34.5–45)
HGB BLD-MCNC: 10.7 G/DL — LOW (ref 11.5–15.5)
HGB BLD-MCNC: 11.6 G/DL — SIGNIFICANT CHANGE UP (ref 11.5–15.5)
IANC: 3.91 K/UL — SIGNIFICANT CHANGE UP (ref 1.8–7.4)
IMM GRANULOCYTES NFR BLD AUTO: 0.5 % — SIGNIFICANT CHANGE UP (ref 0–1.5)
LYMPHOCYTES # BLD AUTO: 1.71 K/UL — SIGNIFICANT CHANGE UP (ref 1–3.3)
LYMPHOCYTES # BLD AUTO: 27.1 % — SIGNIFICANT CHANGE UP (ref 13–44)
MCHC RBC-ENTMCNC: 25.7 PG — LOW (ref 27–34)
MCHC RBC-ENTMCNC: 25.8 PG — LOW (ref 27–34)
MCHC RBC-ENTMCNC: 30.2 GM/DL — LOW (ref 32–36)
MCHC RBC-ENTMCNC: 30.2 GM/DL — LOW (ref 32–36)
MCV RBC AUTO: 85.1 FL — SIGNIFICANT CHANGE UP (ref 80–100)
MCV RBC AUTO: 85.3 FL — SIGNIFICANT CHANGE UP (ref 80–100)
MONOCYTES # BLD AUTO: 0.57 K/UL — SIGNIFICANT CHANGE UP (ref 0–0.9)
MONOCYTES NFR BLD AUTO: 9 % — SIGNIFICANT CHANGE UP (ref 2–14)
NEUTROPHILS # BLD AUTO: 3.91 K/UL — SIGNIFICANT CHANGE UP (ref 1.8–7.4)
NEUTROPHILS NFR BLD AUTO: 62 % — SIGNIFICANT CHANGE UP (ref 43–77)
NRBC # BLD: 0 /100 WBCS — SIGNIFICANT CHANGE UP (ref 0–0)
NRBC # BLD: 0 /100 WBCS — SIGNIFICANT CHANGE UP (ref 0–0)
NRBC # FLD: 0 K/UL — SIGNIFICANT CHANGE UP (ref 0–0)
NRBC # FLD: 0 K/UL — SIGNIFICANT CHANGE UP (ref 0–0)
PLATELET # BLD AUTO: 243 K/UL — SIGNIFICANT CHANGE UP (ref 150–400)
PLATELET # BLD AUTO: 258 K/UL — SIGNIFICANT CHANGE UP (ref 150–400)
RBC # BLD: 4.15 M/UL — SIGNIFICANT CHANGE UP (ref 3.8–5.2)
RBC # BLD: 4.51 M/UL — SIGNIFICANT CHANGE UP (ref 3.8–5.2)
RBC # FLD: 16.7 % — HIGH (ref 10.3–14.5)
RBC # FLD: 17 % — HIGH (ref 10.3–14.5)
RH IG SCN BLD-IMP: POSITIVE — SIGNIFICANT CHANGE UP
SARS-COV-2 IGG+IGM SERPL QL IA: >250 U/ML — HIGH
SARS-COV-2 IGG+IGM SERPL QL IA: POSITIVE
SARS-COV-2 RNA SPEC QL NAA+PROBE: SIGNIFICANT CHANGE UP
WBC # BLD: 6.31 K/UL — SIGNIFICANT CHANGE UP (ref 3.8–10.5)
WBC # BLD: 9.18 K/UL — SIGNIFICANT CHANGE UP (ref 3.8–10.5)
WBC # FLD AUTO: 6.31 K/UL — SIGNIFICANT CHANGE UP (ref 3.8–10.5)
WBC # FLD AUTO: 9.18 K/UL — SIGNIFICANT CHANGE UP (ref 3.8–10.5)

## 2022-08-26 PROCEDURE — 59871 REMOVE CERCLAGE SUTURE: CPT | Mod: GC

## 2022-08-26 PROCEDURE — 88302 TISSUE EXAM BY PATHOLOGIST: CPT | Mod: 26

## 2022-08-26 PROCEDURE — 88305 TISSUE EXAM BY PATHOLOGIST: CPT | Mod: 26

## 2022-08-26 PROCEDURE — 59515 CESAREAN DELIVERY: CPT | Mod: U9,UB,GC

## 2022-08-26 PROCEDURE — 58140 MYOMECTOMY ABDOM METHOD: CPT | Mod: GC

## 2022-08-26 RX ORDER — FAMOTIDINE 10 MG/ML
20 INJECTION INTRAVENOUS ONCE
Refills: 0 | Status: COMPLETED | OUTPATIENT
Start: 2022-08-26 | End: 2022-08-26

## 2022-08-26 RX ORDER — LANOLIN
1 OINTMENT (GRAM) TOPICAL EVERY 6 HOURS
Refills: 0 | Status: DISCONTINUED | OUTPATIENT
Start: 2022-08-26 | End: 2022-08-29

## 2022-08-26 RX ORDER — NALBUPHINE HYDROCHLORIDE 10 MG/ML
2.5 INJECTION, SOLUTION INTRAMUSCULAR; INTRAVENOUS; SUBCUTANEOUS EVERY 6 HOURS
Refills: 0 | Status: DISCONTINUED | OUTPATIENT
Start: 2022-08-26 | End: 2022-08-29

## 2022-08-26 RX ORDER — IBUPROFEN 200 MG
600 TABLET ORAL EVERY 6 HOURS
Refills: 0 | Status: DISCONTINUED | OUTPATIENT
Start: 2022-08-26 | End: 2022-08-27

## 2022-08-26 RX ORDER — DIPHENHYDRAMINE HCL 50 MG
25 CAPSULE ORAL EVERY 6 HOURS
Refills: 0 | Status: DISCONTINUED | OUTPATIENT
Start: 2022-08-26 | End: 2022-08-29

## 2022-08-26 RX ORDER — SODIUM CHLORIDE 9 MG/ML
1000 INJECTION, SOLUTION INTRAVENOUS
Refills: 0 | Status: DISCONTINUED | OUTPATIENT
Start: 2022-08-26 | End: 2022-08-29

## 2022-08-26 RX ORDER — DIPHENHYDRAMINE HCL 50 MG
25 CAPSULE ORAL EVERY 4 HOURS
Refills: 0 | Status: DISCONTINUED | OUTPATIENT
Start: 2022-08-26 | End: 2022-08-29

## 2022-08-26 RX ORDER — CITRIC ACID/SODIUM CITRATE 300-500 MG
30 SOLUTION, ORAL ORAL ONCE
Refills: 0 | Status: COMPLETED | OUTPATIENT
Start: 2022-08-26 | End: 2022-08-26

## 2022-08-26 RX ORDER — OXYCODONE HYDROCHLORIDE 5 MG/1
5 TABLET ORAL ONCE
Refills: 0 | Status: DISCONTINUED | OUTPATIENT
Start: 2022-08-26 | End: 2022-08-28

## 2022-08-26 RX ORDER — SODIUM CHLORIDE 9 MG/ML
1000 INJECTION, SOLUTION INTRAVENOUS ONCE
Refills: 0 | Status: COMPLETED | OUTPATIENT
Start: 2022-08-26 | End: 2022-08-26

## 2022-08-26 RX ORDER — OXYCODONE HYDROCHLORIDE 5 MG/1
5 TABLET ORAL
Refills: 0 | Status: DISCONTINUED | OUTPATIENT
Start: 2022-08-26 | End: 2022-08-28

## 2022-08-26 RX ORDER — ACETAMINOPHEN 500 MG
975 TABLET ORAL
Refills: 0 | Status: DISCONTINUED | OUTPATIENT
Start: 2022-08-26 | End: 2022-08-29

## 2022-08-26 RX ORDER — MAGNESIUM HYDROXIDE 400 MG/1
30 TABLET, CHEWABLE ORAL
Refills: 0 | Status: DISCONTINUED | OUTPATIENT
Start: 2022-08-26 | End: 2022-08-29

## 2022-08-26 RX ORDER — SIMETHICONE 80 MG/1
80 TABLET, CHEWABLE ORAL EVERY 4 HOURS
Refills: 0 | Status: DISCONTINUED | OUTPATIENT
Start: 2022-08-26 | End: 2022-08-29

## 2022-08-26 RX ORDER — OXYTOCIN 10 UNIT/ML
333.33 VIAL (ML) INJECTION
Qty: 20 | Refills: 0 | Status: DISCONTINUED | OUTPATIENT
Start: 2022-08-26 | End: 2022-08-29

## 2022-08-26 RX ORDER — OXYTOCIN 10 UNIT/ML
333.33 VIAL (ML) INJECTION
Qty: 20 | Refills: 0 | Status: DISCONTINUED | OUTPATIENT
Start: 2022-08-26 | End: 2022-08-26

## 2022-08-26 RX ORDER — HEPARIN SODIUM 5000 [USP'U]/ML
5000 INJECTION INTRAVENOUS; SUBCUTANEOUS EVERY 12 HOURS
Refills: 0 | Status: DISCONTINUED | OUTPATIENT
Start: 2022-08-26 | End: 2022-08-29

## 2022-08-26 RX ORDER — TETANUS TOXOID, REDUCED DIPHTHERIA TOXOID AND ACELLULAR PERTUSSIS VACCINE, ADSORBED 5; 2.5; 8; 8; 2.5 [IU]/.5ML; [IU]/.5ML; UG/.5ML; UG/.5ML; UG/.5ML
0.5 SUSPENSION INTRAMUSCULAR ONCE
Refills: 0 | Status: DISCONTINUED | OUTPATIENT
Start: 2022-08-26 | End: 2022-08-29

## 2022-08-26 RX ORDER — KETOROLAC TROMETHAMINE 30 MG/ML
30 SYRINGE (ML) INJECTION EVERY 6 HOURS
Refills: 0 | Status: DISCONTINUED | OUTPATIENT
Start: 2022-08-26 | End: 2022-08-27

## 2022-08-26 RX ORDER — SODIUM CHLORIDE 9 MG/ML
1000 INJECTION, SOLUTION INTRAVENOUS
Refills: 0 | Status: DISCONTINUED | OUTPATIENT
Start: 2022-08-26 | End: 2022-08-26

## 2022-08-26 RX ADMIN — Medication 30 MILLILITER(S): at 11:00

## 2022-08-26 RX ADMIN — FAMOTIDINE 20 MILLIGRAM(S): 10 INJECTION INTRAVENOUS at 11:01

## 2022-08-26 RX ADMIN — SODIUM CHLORIDE 1000 MILLILITER(S): 9 INJECTION, SOLUTION INTRAVENOUS at 17:15

## 2022-08-26 RX ADMIN — Medication 25 MILLIGRAM(S): at 18:30

## 2022-08-26 RX ADMIN — SODIUM CHLORIDE 125 MILLILITER(S): 9 INJECTION, SOLUTION INTRAVENOUS at 11:23

## 2022-08-26 RX ADMIN — Medication 30 MILLIGRAM(S): at 23:29

## 2022-08-26 RX ADMIN — Medication 975 MILLIGRAM(S): at 23:28

## 2022-08-26 RX ADMIN — NALBUPHINE HYDROCHLORIDE 2.5 MILLIGRAM(S): 10 INJECTION, SOLUTION INTRAMUSCULAR; INTRAVENOUS; SUBCUTANEOUS at 19:47

## 2022-08-26 RX ADMIN — SODIUM CHLORIDE 2000 MILLILITER(S): 9 INJECTION, SOLUTION INTRAVENOUS at 11:23

## 2022-08-26 NOTE — OB RN DELIVERY SUMMARY - NSSELHIDDEN_OBGYN_ALL_OB_FT
[NS_DeliveryRN_OBGYN_ALL_OB_FT:[id number]],[NS_DeliveryAttending1_OBGYN_ALL_OB_FT:[id number]=],[NS_DeliveryAssist1_OBGYN_ALL_OB_FT:[id number]]

## 2022-08-26 NOTE — OB RN TRIAGE NOTE - TOBACCO USE
Former smoker S Plasty Text: Given the location and shape of the defect, and the orientation of relaxed skin tension lines, an S-plasty was deemed most appropriate for repair.  Using a sterile surgical marker, the appropriate outline of the S-plasty was drawn, incorporating the defect and placing the expected incisions within the relaxed skin tension lines where possible.  The area thus outlined was incised deep to adipose tissue with a #15 scalpel blade.  The skin margins were undermined to an appropriate distance in all directions utilizing iris scissors. The skin flaps were advanced over the defect.  The opposing margins were then approximated with interrupted buried subcutaneous sutures.

## 2022-08-26 NOTE — OB PROVIDER TRIAGE NOTE - NS_FETALHEARTRATE_OBGYN_ALL_OB_FT
[address] [phone]               Thank you for choosing us for your health care visit with Misty Fernandes MD.  We are glad to serve you and happy to provide you with this summary of lisinopril 20 MG Tabs   Take 1 tablet (20 mg total) by mouth daily.    Commonly known as:  PRINIVIL,ZESTRIL           MetFORMIN HCl  MG Tb24   Take 1 tablet (750 mg total) by mouth daily with breakfast.   Commonly known as:  GLUCOPHAGE-XR           O Visit [facility] online at  [email or web address]

## 2022-08-26 NOTE — OB PROVIDER H&P - NSHPPHYSICALEXAM_GEN_ALL_CORE
Vital Signs Last 24 Hrs  T(C): 37.1 (26 Aug 2022 08:34), Max: 37.1 (26 Aug 2022 08:28)  T(F): 98.78 (26 Aug 2022 08:34), Max: 98.8 (26 Aug 2022 08:28)  HR: 82 (26 Aug 2022 10:06) (75 - 91)  BP: 94/51 (26 Aug 2022 10:15) (94/51 - 132/59)  RR: 20 (26 Aug 2022 08:28) (20 - 20)      Abdomen gravid, soft and nontender  Continue EFM  SSE - cervix appears closed on inspection.  Unable to visualise sutures.  No lesions noted.  SVE - C/-3 Soft.    Sutures palpated, mild tension palpated with contraction   TAS - cephalic presentation /post. plac.  Uterine fibroids noted on upper anterior uterine wall.  GBS - negative  Clinical EFW - 3500G

## 2022-08-26 NOTE — OB PROVIDER TRIAGE NOTE - NSHPPHYSICALEXAM_GEN_ALL_CORE
Vital Signs Last 24 Hrs  T(C): 37.1 (26 Aug 2022 08:34), Max: 37.1 (26 Aug 2022 08:28)  T(F): 98.78 (26 Aug 2022 08:34), Max: 98.8 (26 Aug 2022 08:28)  HR: 82 (26 Aug 2022 10:06) (75 - 91)  BP: 94/51 (26 Aug 2022 10:15) (94/51 - 132/59)  RR: 20 (26 Aug 2022 08:28) (20 - 20)      Abdomen gravid, soft and nontender  Continue EFM  SSE - cervix appears closed on inspection.  Unable to visualise sutures.  No lesions noted.  SVE - C/-3 Soft.    Sutures palpated, mild tension palpated with contraction   TAS - cephalic presentation /post. plac.  Uterine fibroids noted on upper anterior uterine wall.  GBS - negative

## 2022-08-26 NOTE — OB RN DELIVERY SUMMARY - NS_ADMITROM_OBGYN_ALL_OB
[facility]

                             Created on: 2018



Estrella Rose

External Reference #: [id number]

: 1945

Sex: Female



Demographics







                          Address                   [address] [place]  [address]

 

                          Preferred Language        Unknown

 

                          Marital Status            Unknown

 

                          Roman Catholic Affiliation     Unknown

 

                          Race                      Unknown

 

                          Ethnic Group              Unknown





Author







                          Author                    SHANE POWERS

 

                          Organization              [facility]

 

                          Address                   [address] [place]  [address]



 

                          Phone                     [phone]







Care Team Providers







                    Care Team Member Name    Role                Phone

 

                    GIOSHABBIRELA    Unavailable         [phone]







PROBLEMS







          Type      Condition    ICD9-CM Code    OLG75-LT Code    Onset Dates    Condition Status    SNOMED

 Code

 

          Problem    Bipolar I disorder with anxious distress              F31.9               Active    [id number]









ALLERGIES

No Information



ENCOUNTERS







                Encounter       Location        Date            Diagnosis

 

                          [facility]     [id number] [place] [phone]

                          13 Aug, 2018               

 

                          [id number] [place] [phone]

                                        Bipolar I disorder with anxious distress F31.9

 

                          [facility]     [id number] [place] [address]

                                        Bipolar I disorder with anxious distress F31.9

 

                          [facility]     [id number] [place] [address]

                                        Bipolar I disorder with anxious distress F31.9

 

                          [facility]     [id number] [place] [phone]

                          26 Mar, 2018              Bipolar I disorder with anxious distress F31.9

 

                          [facility]     [id number] [place] [phone]

                                        Bipolar I disorder with anxious distress F31.9

 

                          [facility]     [id number] [place] [address]

                                        Bipolar I disorder with anxious distress F31.9

 

                          [facility]     [id number] [place] [phone]

                                        Bipolar I disorder with anxious distress F31.9

 

                          [facility]     [id number] [address]

                          30 Oct, 2017              Bipolar I disorder with anxious distress F31.9

 

                          [facility]     [address] [place] [phone]

                          02 Oct, 2017              Bipolar I disorder with anxious distress F31.9

 

                          [facility]     [id number] [address]

                          08 Aug, 2017              Bipolar I disorder with anxious distress F31.9

 

                          [facility]     [id number] [address]

                                        Bipolar I disorder with anxious distress F31.9

 

                          [facility]     [address] [place] [address]

                                        Bipolar II disorder F31.81

 

                          [facility]     [id number] [address]

                                         

 

                          [facility]     [address] [place] [address]

                                        Bipolar II disorder F31.81

 

                          [facility]     [address] [place] [phone]

                          31 May, 2017              Bipolar II disorder F31.81

 

                          [facility]     [id number] [address]

                          17 May, 2017              Bipolar II disorder F31.81

 

                          [facility]     [id number] [address]

                          03 May, 2017              Bipolar II disorder F31.81

 

                          [facility]     [id number] [address]

                                        Bipolar II disorder F31.81

 

                          [facility]     [address] [place] [address]

                          21 Mar, 2017              Bipolar II disorder F31.81

 

                          [facility]     [id number] [address]

                                        Bipolar II disorder F31.81

 

                          [facility]     [id number] [address]

                          13 Oct, 2016              Bipolar II disorder F31.81

 

                          [facility]     [id number] [address]

                          23 Sep, 2016              Bipolar II disorder F31.81

 

                          [facility]     [id number] [address]

                          15 Sep, 2016              Bipolar II disorder F31.81

 

                          [facility]     [id number] [place] [address], [place] [address]

                          01 Sep, 2016              Bipolar II disorder F31.81

 

                          [facility]     [id number] [place] [address], [place] [address]

                          18 Aug, 2016              Bipolar II disorder F31.81

 

                          [facility]     [id number] [address]

                          04 Aug, 2016              Bipolar II disorder F31.81

 

                          [facility]     [address] [place] [address]

                                        Bipolar II disorder F31.81

 

                          [facility]     [address] [place] [address]

                                        Bipolar II disorder F31.81

 

                          [facility]     [id number] [address]

                          10 Gary, 2016              Bipolar II disorder F31.81

 

                          [facility]     [address] [place] [address]

                          25 May, 2016              Bipolar II disorder F31.81

 

                          [facility]     [id number] [address]

                          03 May, 2016              Bipolar II disorder F31.81

 

                          [facility]     [id number] [address]

                                        Bipolar II disorder F31.81

 

                          [facility]     [id number] [address]

                          30 Mar, 2016              Bipolar II disorder F31.81

 

                          [facility]     [id number] [address]

                                        Bipolar II disorder F31.81

 

                          [facility]     [id number] [address]

                                        Bipolar II disorder F31.81

 

                          [facility]     [address] [place] [address]

                          15 Abdirahman, 2016              Bipolar II disorder F31.81

 

                          [facility]     [id number] [place] [address]

                                        Bipolar II disorder F31.81

 

                          [facility]     [id number] [place] [address]

                          30 Oct, 2015              Bipolar II disorder F31.81

 

                          [facility]     [address] [place] [address]

                          07 Oct, 2015              Bipolar II disorder F31.81

 

                          [facility]     [address] [place] [address]

                          22 Sep, 2015              Bipolar II disorder 296.89

 

                          [facility]     [address] [place] [address]

                          28 Aug, 2015              Bipolar II disorder 296.89







IMMUNIZATIONS

No Known Immunizations



SOCIAL HISTORY

Never Assessed



REASON FOR VISIT

 Follow-up Bipolar Disorder



PLAN OF CARE







                          Activity                  Details









                                         









                          Follow Up                 4 Weeks Reason: Follow-up







VITAL SIGNS





MEDICATIONS

Unknown Medications



RESULTS

No Results



PROCEDURES







                Procedure       Date Ordered    Result          Body Site

 

                [facility] VISIT MENTAL HEALTH ESTAB PT    2018                     

 

                          Psychotherapy, patient &/family, 45 minutes, established patient    2018

                                                     







INSTRUCTIONS





MEDICATIONS ADMINISTERED

No Known Medications No

## 2022-08-26 NOTE — OB PROVIDER H&P - ASSESSMENT
Vital Signs Last 24 Hrs  T(C): 37.1 (26 Aug 2022 08:34), Max: 37.1 (26 Aug 2022 08:28)  T(F): 98.78 (26 Aug 2022 08:34), Max: 98.8 (26 Aug 2022 08:28)  HR: 82 (26 Aug 2022 10:06) (75 - 91)  BP: 94/51 (26 Aug 2022 10:15) (94/51 - 132/59)  RR: 20 (26 Aug 2022 08:28) (20 - 20)      Abdomen gravid, soft and nontender  Continue EFM  SSE - cervix appears closed on inspection.  Unable to visualise sutures.              No lesions noted.  SVE - C/-3 Soft.    Sutures palpated, mild tension palpated with contraction   TAS - cephalic presentation /post. plac.  Uterine fibroids noted on upper anterior uterine wall.  GBS - negative    Discussed findings with Dr Aleman.   Huddle called.  Patient for rep C/S with BTL.

## 2022-08-26 NOTE — OB PROVIDER DELIVERY SUMMARY - NSPROVIDERDELIVERYNOTE_OBGYN_ALL_OB_FT
Repeat LTCS and bilateral salpingectomy using Ligasure, uncomplicated. Ferrara cerclage removed in OR prior to . 4cm pedunculated submucosal fibroid seen by the right hysterotomy angle and removed bluntly and with the richard scissors.  Viable male infant, vertex presentation, Apgars 9/9, cord gasses sent  Grossly normal fallopian tubes, uterus, and ovaries  Uterus closed in 1 locked running layer with caprosyn  Fascia closed with 0 vicryl  Skin closed with 3-0 monocryl    EBL: 1274  IVF:   UOP: 500  Dictation#: [id number]    LOVELY Gann, PGY2  w/  Repeat LTCS and bilateral salpingectomy using Ligasure, uncomplicated. Ferrara cerclage removed in OR prior to . 4cm pedunculated submucosal fibroid seen by the right hysterotomy angle and removed bluntly and with the richard scissors.  Viable male infant, vertex presentation, Apgars 9/9, cord gasses sent  Grossly normal fallopian tubes, uterus, and ovaries  Uterus closed in 1 locked running layer with caprosyn  Fascia closed with 0 vicryl  Skin closed with 3-0 monocryl    EBL: 1274  IVF:   UOP: 500  Dictation#: [id number]    LOVELY Gann, PGY2  w/ Dr. Aleman Repeat LTCS and bilateral salpingectomy using Ligasure, uncomplicated. Ferrara cerclage removed in OR prior to . 4cm pedunculated submucosal fibroid seen by the right hysterotomy angle and removed bluntly and with the richard scissors.  Viable male infant, vertex presentation, Apgars 9/9, cord gasses sent  Grossly normal fallopian tubes, uterus, and ovaries  Uterus closed in 1 locked running layer with caprosyn. Second layer imbricating the first.   Fascia closed with 0 vicryl  Skin closed with 3-0 monocryl    EBL: 1274  IVF: 2000  UOP: 500  Dictation#: [id number]    LOVELY Gann, PGY2  w/ Dr. Aleman

## 2022-08-26 NOTE — OB RN PATIENT PROFILE - NS_OBGYNHISTORY_OBGYN_ALL_OB_FT
2016 c/s [facility]  top x 2   5sabs 2d&c  23weelk delivery baby passed 4 hours later  abnormal pap 2021 with colpo  Ferrara's cerclage in place 3/10/2022  HSV

## 2022-08-26 NOTE — OB PROVIDER H&P - NS_OBGYNHISTORY_OBGYN_ALL_OB_FT
2016 c/s [facility]  top x 2   5sabs 2d&c  23weelk delivery baby passed 4 hours later  abnormal pap 2021 with colpo  Ferrara's cerclage in place 3/10/2022  HSV

## 2022-08-26 NOTE — OB NEONATOLOGY/PEDIATRICIAN DELIVERY SUMMARY - NSPEDSNEONOTESA_OBGYN_ALL_OB_FT
37.4 wk male born via reepat CS to a 34 y/o  blood type O+ mother. Maternal history of short cervix sp cerclage, HSV on valtrax, bariatric surgery. PNL -/-/NR/I, GBS - on . AROM at TOD with clear fluids. Baby emerged vigorous, crying, was w/d/s/s with APGARS of 8/9. At 12 minutes of life, peds was called for retractions, grunting and nasal flaring. CPAP was started for 6 minutes. Mom plans to initiate breastfeeding, consents Hep B vaccine and consents circ.  EOS 0.04.  Highest maternal temp 36.7

## 2022-08-26 NOTE — OB PROVIDER H&P - HISTORY OF PRESENT ILLNESS
PNC Provider:  Dr Overton  EDC:  2022  Rep. C/S & BTL scheduled for 2022.    Patient is a  @ 37 4/7wks gest., with a McDonold's cerclage in place since 3/10/2022,  reporting to triage with  c/o intermittent pelvic pressure/ctx since [address].  Reports pain of 7/10 with contractions.  Denies LOF or vaginal bleeding.  Reports good fetal movements.    AP Course:  Bao Fentonold's cerclage placed on 3/10/20/2022;  Meds:  pnv, pantoprazole, valtrex & iron  NKDA    PMHx - GERD  PSHx  - Gastric sleeve -            - D&c x 2             OBHx -  PTD @ 23wks gest  (pprom'ed & labor.  had Ferrara's in place for incompetent cervix) -  at [facility].     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

## 2022-08-26 NOTE — OB PROVIDER TRIAGE NOTE - HISTORY OF PRESENT ILLNESS
[facility] Provider:  Dr Overton  EDC:  2022  Rep. C/S & BTL scheduled for 2022.    Patient is a  @ 37 4/7wks gest., with a Americaonoavery's cerclage in place since 3/10/2022,  reporting to triage with  c/o intermittent pelvic pressure/ctx since [address].  Reports pain of 7/10 with contractions.  Denies LOF or vaginal bleeding.  Reports good fetal movements.    AP Course:  Bao Lackey's cerclage placed on 3/10/20/2022;  Meds:  pnv, pantoprazole & iron  NKDA    PMHx - GERD  PSHx  -   OBHx -  PTD @ 23wks gest  (pprom'ed & labor.  had Ferrara's in place for incompetent cervix) -  at [facility]           - C/S for NRFHT - 10/20/2016 - 7lbs 14oz           - Missed ab with D&C x 2           - SAB x1        PNC Provider:  Dr Overton  EDC:  2022  Rep. C/S & BTL scheduled for 2022.    Patient is a  @ 37 4/7wks gest., with a McDonold's cerclage in place since 3/10/2022,  reporting to triage with  c/o intermittent pelvic pressure/ctx since [address].  Reports pain of 7/10 with contractions.  Denies LOF or vaginal bleeding.  Reports good fetal movements.    AP Course:  Bao Fentonold's cerclage placed on 3/10/20/2022;  Meds:  pnv, pantoprazole, valtrex & iron  NKDA    PMHx - GERD  PSHx  - Gastric sleeve -            - D&c x 2             OBHx -  PTD @ 23wks gest  (pprom'ed & labor.  had Ferrara's in place for incompetent cervix) -  at [facility].     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

## 2022-08-26 NOTE — OB RN INTRAOPERATIVE NOTE - NSSELHIDDEN_OBGYN_ALL_OB_FT
[NS_DeliveryRN_OBGYN_ALL_OB_FT:[id number]] [NS_DeliveryRN_OBGYN_ALL_OB_FT:[id number]],[NS_DeliveryAttending2_OBGYN_ALL_OB_FT:[email or web address]],[NS_DeliveryAssist1_OBGYN_ALL_OB_FT:[id number]]

## 2022-08-26 NOTE — OB RN TRIAGE NOTE - PATIENT'S SEXUAL ORIENTATION
Left message for patient regarding results and said to call back if he had any questions.  Sent low cholesterol diet packet in the mail.    Heterosexual

## 2022-08-26 NOTE — OB RN DELIVERY SUMMARY - NS_SEPSISRSKCALC_OBGYN_ALL_OB_FT
EOS calculated successfully. EOS Risk Factor: 0.5/1000 live births ([facility] national incidence); GA=37w4d; Temp=98.8; ROM=0.017; GBS='Positive'; Antibiotics='No antibiotics or any antibiotics < 2 hrs prior to birth'

## 2022-08-26 NOTE — OB RN TRIAGE NOTE - NS_OBGYNHISTORY_OBGYN_ALL_OB_FT
2016 c/s [facility]  top x 2   5sabs 2d&c  23weelk delivery baby passed 4 hours later 2016 c/s [facility]  top x 2   5sabs 2d&c  23weelk delivery baby passed 4 hours later  abnormal pap 2021 with colpo  Alem's cerclage in place 2016 c/s [facility]  top x 2   5sabs 2d&c  23weelk delivery baby passed 4 hours later  abnormal pap 2021 with colpo  Ferrara's cerclage in place 3/10/2022 2016 c/s [facility]  top x 2   5sabs 2d&c  23weelk delivery baby passed 4 hours later  abnormal pap 2021 with colpo  Ferrara's cerclage in place 3/10/2022  HSV

## 2022-08-26 NOTE — OB RN PATIENT PROFILE - FUNCTIONAL ASSESSMENT - DAILY ACTIVITY 5.
Breath Sounds equal & clear to percussion & auscultation, no accessory muscle use 4 = No assist / stand by assistance

## 2022-08-26 NOTE — OB RN TRIAGE NOTE - PRO MENTAL HEALTH SX RECENT
pt having anxiety, using deep breathing exercises. Offered information for support groups and crisis phone lines to patient. Patient expressed did not feel need right now./feeling depressed

## 2022-08-26 NOTE — OB PROVIDER TRIAGE NOTE - NSICDXPASTSURGICALHX_GEN_ALL_CORE_FT
PAST SURGICAL HISTORY:  Chronic GERD     History of D&C 2015    S/P bariatric surgery 2012 Gastric sleeve placement ( @ [facility])

## 2022-08-26 NOTE — OB PROVIDER TRIAGE NOTE - NS_OBGYNHISTORY_OBGYN_ALL_OB_FT
2016 c/s [facility]  top x 2   5sabs 2d&c  23weelk delivery baby passed 4 hours later  abnormal pap 2021 with colpo  Ferrara's cerclage in place 3/10/2022  HSV

## 2022-08-26 NOTE — OB PROVIDER H&P - NSICDXPASTSURGICALHX_GEN_ALL_CORE_FT
PAST SURGICAL HISTORY:  Chronic GERD     History of D&C 2015    S/P bariatric surgery 2012 Gastric sleeve placement ( @ [facility])

## 2022-08-26 NOTE — OB RN TRIAGE NOTE - NSICDXPASTSURGICALHX_GEN_ALL_CORE_FT
PAST SURGICAL HISTORY:  Chronic GERD     History of D&C 2015    S/P bariatric surgery 2012 Gastric sleeve placement ( @ [facility])

## 2022-08-26 NOTE — OB RN PATIENT PROFILE - EXTENSIONS OF SELF_ADULT
Body Location Override (Optional - Billing Will Still Be Based On Selected Body Map Location If Applicable): right preauricular Detail Level: Simple Depth Of Biopsy: dermis Was A Bandage Applied: Yes Size Of Lesion In Cm: 1.8 Biopsy Type: H and E Biopsy Method: Dermablade Anesthesia Type: 2% lidocaine with epinephrine Anesthesia Volume In Cc (Will Not Render If 0): 0.5 Additional Anesthesia Volume In Cc (Will Not Render If 0): 0 Hemostasis: Aluminum Chloride Wound Care: Vaseline Dressing: bandage Destruction After The Procedure: No Type Of Destruction Used: Curettage Cryotherapy Text: The wound bed was treated with cryotherapy after the biopsy was performed. Electrodesiccation Text: The wound bed was treated with electrodesiccation after the biopsy was performed. Electrodesiccation And Curettage Text: The wound bed was treated with electrodesiccation and curettage after the biopsy was performed. Silver Nitrate Text: The wound bed was treated with silver nitrate after the biopsy was performed. Lab: [facility] Lab Facility: [id number] Consent: The provider's intent is to obtain a tissue sample solely for diagnostic purposes. Written consent to obtain tissue sample was obtained and risks were reviewed including but not limited to scarring, infection, bleeding, scabbing, incomplete removal, nerve damage and allergy to anesthesia. Post-Care Instructions: I reviewed with the patient in detail, both written and verbal, post-care instructions. Patient is to keep the biopsy site dry overnight, and then apply vaseline twice daily until healed. Patient verbalized understanding. Notification Instructions: Patient will be notified of biopsy results. However, patient instructed to call the office if not contacted within 2 weeks. Billing Type: [employer] Information: Selecting Yes will display possible errors in your note based on the variables you have selected. This validation is only offered as a suggestion for you. PLEASE NOTE THAT THE VALIDATION TEXT WILL BE REMOVED WHEN YOU FINALIZE YOUR NOTE. IF YOU WANT TO FAX A PRELIMINARY NOTE YOU WILL NEED TO TOGGLE THIS TO 'NO' IF YOU DO NOT WANT IT IN YOUR FAXED NOTE. Body Location Override (Optional - Billing Will Still Be Based On Selected Body Map Location If Applicable): left upper arm Size Of Lesion In Cm: 1.2 Lab: 249 Lab Facility: [id number] Consent: The provider's intent is to obtain a tissue sample solely for diagnostic purposes.  Written consent to obtain tissue sample was obtained and risks were reviewed including but not limited to scarring, infection, bleeding, scabbing, incomplete removal, nerve damage and allergy to anesthesia. Billing Type: Third-Party Bill Body Location Override (Optional - Billing Will Still Be Based On Selected Body Map Location If Applicable): left post neck Size Of Lesion In Cm: 0.6 Body Location Override (Optional - Billing Will Still Be Based On Selected Body Map Location If Applicable): right upper back None

## 2022-08-26 NOTE — OB PROVIDER DELIVERY SUMMARY - NSSELHIDDEN_OBGYN_ALL_OB_FT
[NS_DeliveryRN_OBGYN_ALL_OB_FT:[id number]],[NS_DeliveryAttending1_OBGYN_ALL_OB_FT:[id number]],[NS_DeliveryAssist1_OBGYN_ALL_OB_FT:[id number]]

## 2022-08-26 NOTE — OB RN PATIENT PROFILE - NSICDXPASTSURGICALHX_GEN_ALL_CORE_FT
PAST SURGICAL HISTORY:  Chronic GERD     History of D&C 2015    S/P bariatric surgery 2012 Gastric sleeve placement ( @ [facility])

## 2022-08-26 NOTE — OB PROVIDER H&P - NS_PRENATALMEDS_OBGYN_A_OB
iron & pantoprazole/Prenatal Vitamins/Other
Acute promyelocytic leukemia    Asthma    Blind  Left Eye  Diabetes mellitus    Diabetic neuropathy    Hyperlipidemia    Hypertension    Hypothyroidism    ANIKA treated with BiPAP    Osteoarthritis    Psoriatic arthritis    Tuberculosis  Treated at the age of 1 years

## 2022-08-26 NOTE — OB RN PATIENT PROFILE - FALL HARM RISK - UNIVERSAL INTERVENTIONS
Bed in lowest position, wheels locked, appropriate side rails in place/Call bell, personal items and telephone in reach/Instruct patient to call for assistance before getting out of bed or chair/Non-slip footwear when patient is out of bed/[place] to call system/Physically safe environment - no spills, clutter or unnecessary equipment/Purposeful Proactive Rounding/Room/bathroom lighting operational, light cord in reach

## 2022-08-27 LAB
BASOPHILS # BLD AUTO: 0.02 K/UL — SIGNIFICANT CHANGE UP (ref 0–0.2)
BASOPHILS NFR BLD AUTO: 0.2 % — SIGNIFICANT CHANGE UP (ref 0–2)
EOSINOPHIL # BLD AUTO: 0.12 K/UL — SIGNIFICANT CHANGE UP (ref 0–0.5)
EOSINOPHIL NFR BLD AUTO: 1.2 % — SIGNIFICANT CHANGE UP (ref 0–6)
HCT VFR BLD CALC: 28.7 % — LOW (ref 34.5–45)
HGB BLD-MCNC: 9 G/DL — LOW (ref 11.5–15.5)
IANC: 7.13 K/UL — SIGNIFICANT CHANGE UP (ref 1.8–7.4)
IMM GRANULOCYTES NFR BLD AUTO: 0.4 % — SIGNIFICANT CHANGE UP (ref 0–1.5)
LYMPHOCYTES # BLD AUTO: 1.89 K/UL — SIGNIFICANT CHANGE UP (ref 1–3.3)
LYMPHOCYTES # BLD AUTO: 18.8 % — SIGNIFICANT CHANGE UP (ref 13–44)
MCHC RBC-ENTMCNC: 26.1 PG — LOW (ref 27–34)
MCHC RBC-ENTMCNC: 31.4 GM/DL — LOW (ref 32–36)
MCV RBC AUTO: 83.2 FL — SIGNIFICANT CHANGE UP (ref 80–100)
MONOCYTES # BLD AUTO: 0.87 K/UL — SIGNIFICANT CHANGE UP (ref 0–0.9)
MONOCYTES NFR BLD AUTO: 8.6 % — SIGNIFICANT CHANGE UP (ref 2–14)
NEUTROPHILS # BLD AUTO: 7.13 K/UL — SIGNIFICANT CHANGE UP (ref 1.8–7.4)
NEUTROPHILS NFR BLD AUTO: 70.8 % — SIGNIFICANT CHANGE UP (ref 43–77)
NRBC # BLD: 0 /100 WBCS — SIGNIFICANT CHANGE UP (ref 0–0)
NRBC # FLD: 0 K/UL — SIGNIFICANT CHANGE UP (ref 0–0)
PLATELET # BLD AUTO: 234 K/UL — SIGNIFICANT CHANGE UP (ref 150–400)
RBC # BLD: 3.45 M/UL — LOW (ref 3.8–5.2)
RBC # FLD: 16.7 % — HIGH (ref 10.3–14.5)
T PALLIDUM AB TITR SER: NEGATIVE — SIGNIFICANT CHANGE UP
WBC # BLD: 10.07 K/UL — SIGNIFICANT CHANGE UP (ref 3.8–10.5)
WBC # FLD AUTO: 10.07 K/UL — SIGNIFICANT CHANGE UP (ref 3.8–10.5)

## 2022-08-27 RX ORDER — FAMOTIDINE 10 MG/ML
20 INJECTION INTRAVENOUS
Refills: 0 | Status: DISCONTINUED | OUTPATIENT
Start: 2022-08-27 | End: 2022-08-29

## 2022-08-27 RX ADMIN — Medication 30 MILLIGRAM(S): at 00:00

## 2022-08-27 RX ADMIN — Medication 30 MILLIGRAM(S): at 10:38

## 2022-08-27 RX ADMIN — SIMETHICONE 80 MILLIGRAM(S): 80 TABLET, CHEWABLE ORAL at 21:50

## 2022-08-27 RX ADMIN — MAGNESIUM HYDROXIDE 30 MILLILITER(S): 400 TABLET, CHEWABLE ORAL at 09:20

## 2022-08-27 RX ADMIN — Medication 975 MILLIGRAM(S): at 21:48

## 2022-08-27 RX ADMIN — Medication 30 MILLIGRAM(S): at 17:37

## 2022-08-27 RX ADMIN — HEPARIN SODIUM 5000 UNIT(S): 5000 INJECTION INTRAVENOUS; SUBCUTANEOUS at 06:31

## 2022-08-27 RX ADMIN — Medication 30 MILLIGRAM(S): at 11:08

## 2022-08-27 RX ADMIN — Medication 975 MILLIGRAM(S): at 14:56

## 2022-08-27 RX ADMIN — SIMETHICONE 80 MILLIGRAM(S): 80 TABLET, CHEWABLE ORAL at 09:20

## 2022-08-27 RX ADMIN — Medication 975 MILLIGRAM(S): at 22:30

## 2022-08-27 RX ADMIN — Medication 975 MILLIGRAM(S): at 04:50

## 2022-08-27 RX ADMIN — NALBUPHINE HYDROCHLORIDE 2.5 MILLIGRAM(S): 10 INJECTION, SOLUTION INTRAMUSCULAR; INTRAVENOUS; SUBCUTANEOUS at 02:42

## 2022-08-27 RX ADMIN — Medication 975 MILLIGRAM(S): at 14:52

## 2022-08-27 RX ADMIN — Medication 30 MILLIGRAM(S): at 05:20

## 2022-08-27 RX ADMIN — FAMOTIDINE 20 MILLIGRAM(S): 10 INJECTION INTRAVENOUS at 10:38

## 2022-08-27 RX ADMIN — HEPARIN SODIUM 5000 UNIT(S): 5000 INJECTION INTRAVENOUS; SUBCUTANEOUS at 17:37

## 2022-08-27 RX ADMIN — Medication 975 MILLIGRAM(S): at 05:20

## 2022-08-27 RX ADMIN — Medication 30 MILLIGRAM(S): at 04:50

## 2022-08-27 RX ADMIN — NALBUPHINE HYDROCHLORIDE 2.5 MILLIGRAM(S): 10 INJECTION, SOLUTION INTRAMUSCULAR; INTRAVENOUS; SUBCUTANEOUS at 03:30

## 2022-08-27 RX ADMIN — Medication 30 MILLIGRAM(S): at 18:07

## 2022-08-27 NOTE — PROGRESS NOTE ADULT - SUBJECTIVE AND OBJECTIVE BOX
Postpartum Note,  Section  She is a  32y woman who is now post-operative day: 1    Subjective:  The patient feels well.  She is ambulating.   She is tolerating regular diet.  She denies nausea and vomiting.  She is voiding.  Her pain is controlled.  She reports normal postpartum bleeding.  She is breastfeeding.  She is formula feeding.    Physical exam:    Vital Signs Last 24 Hrs  T(C): 36.6 (27 Aug 2022 14:11), Max: 36.9 (27 Aug 2022 01:42)  T(F): 97.9 (27 Aug 2022 14:11), Max: 98.4 (27 Aug 2022 01:42)  HR: 77 (27 Aug 2022 14:11) (64 - 77)  BP: 109/65 (27 Aug 2022 14:11) (101/58 - 117/58)  BP(mean): --  RR: 18 (27 Aug 2022 14:11) (18 - 18)  SpO2: 100% (27 Aug 2022 14:11) (97% - 100%)    Parameters below as of 27 Aug 2022 06:40  Patient On (Oxygen Delivery Method): room air        Gen: NAD  Breast: Soft, nontender, not engorged.  Abdomen: Soft, nontender, no distension , firm uterine fundus at umbilicus.  Incision: Clean, dry, and intact with steri strips  Pelvic: Normal lochia noted  Ext: No calf tenderness    LABS:                        9.0    10.07 )-----------( 234      ( 27 Aug 2022 07:00 )             28.7                         10.7   9.18  )-----------( 243      ( 26 Aug 2022 15:30 )             35.4                         11.6   6.31  )-----------( 258      ( 26 Aug 2022 10:00 )             38.4                   Allergies    No Known Allergies    Intolerances      MEDICATIONS  (STANDING):  acetaminophen     Tablet .. 975 milliGRAM(s) Oral <User Schedule>  diphtheria/tetanus/pertussis (acellular) Vaccine (ADAcel) 0.5 milliLiter(s) IntraMuscular once  famotidine    Tablet 20 milliGRAM(s) Oral two times a day  heparin   Injectable 5000 Unit(s) SubCutaneous every 12 hours  ibuprofen  Tablet. 600 milliGRAM(s) Oral every 6 hours  lactated ringers. 1000 milliLiter(s) (125 mL/Hr) IV Continuous <Continuous>  oxytocin Infusion 333.333 milliUNIT(s)/Min (1000 mL/Hr) IV Continuous <Continuous>    MEDICATIONS  (PRN):  diphenhydrAMINE 25 milliGRAM(s) Oral every 4 hours PRN Rash and/or Itching  diphenhydrAMINE 25 milliGRAM(s) Oral every 6 hours PRN Pruritus  lanolin Ointment 1 Application(s) Topical every 6 hours PRN Sore Nipples  magnesium hydroxide Suspension 30 milliLiter(s) Oral two times a day PRN Constipation  nalbuphine Injectable 2.5 milliGRAM(s) IV Push every 6 hours PRN Pruritus  oxyCODONE    IR 5 milliGRAM(s) Oral every 3 hours PRN Moderate to Severe Pain (4-10)  oxyCODONE    IR 5 milliGRAM(s) Oral once PRN Moderate to Severe Pain (4-10)  simethicone 80 milliGRAM(s) Chew every 4 hours PRN Gas

## 2022-08-27 NOTE — PROGRESS NOTE ADULT - SUBJECTIVE AND OBJECTIVE BOX
ANESTHESIA POSTOP CHECK    32y Female POSTOP DAY 1 S/P       NO APPARENT ANESTHESIA COMPLICATIONS

## 2022-08-28 RX ORDER — OXYCODONE HYDROCHLORIDE 5 MG/1
5 TABLET ORAL ONCE
Refills: 0 | Status: DISCONTINUED | OUTPATIENT
Start: 2022-08-28 | End: 2022-08-29

## 2022-08-28 RX ORDER — OXYCODONE HYDROCHLORIDE 5 MG/1
5 TABLET ORAL
Refills: 0 | Status: DISCONTINUED | OUTPATIENT
Start: 2022-08-28 | End: 2022-08-29

## 2022-08-28 RX ORDER — ACETAMINOPHEN 500 MG
1000 TABLET ORAL EVERY 6 HOURS
Refills: 0 | Status: DISCONTINUED | OUTPATIENT
Start: 2022-08-28 | End: 2022-08-29

## 2022-08-28 RX ORDER — OXYCODONE HYDROCHLORIDE 5 MG/1
5 TABLET ORAL
Refills: 0 | Status: COMPLETED | OUTPATIENT
Start: 2022-08-28 | End: 2022-09-04

## 2022-08-28 RX ORDER — ACETAMINOPHEN 500 MG
975 TABLET ORAL EVERY 6 HOURS
Refills: 0 | Status: DISCONTINUED | OUTPATIENT
Start: 2022-08-28 | End: 2022-08-29

## 2022-08-28 RX ADMIN — Medication 975 MILLIGRAM(S): at 08:38

## 2022-08-28 RX ADMIN — OXYCODONE HYDROCHLORIDE 5 MILLIGRAM(S): 5 TABLET ORAL at 00:52

## 2022-08-28 RX ADMIN — Medication 975 MILLIGRAM(S): at 00:00

## 2022-08-28 RX ADMIN — Medication 975 MILLIGRAM(S): at 09:18

## 2022-08-28 RX ADMIN — Medication 975 MILLIGRAM(S): at 02:49

## 2022-08-28 RX ADMIN — OXYCODONE HYDROCHLORIDE 5 MILLIGRAM(S): 5 TABLET ORAL at 11:44

## 2022-08-28 RX ADMIN — OXYCODONE HYDROCHLORIDE 5 MILLIGRAM(S): 5 TABLET ORAL at 19:43

## 2022-08-28 RX ADMIN — OXYCODONE HYDROCHLORIDE 5 MILLIGRAM(S): 5 TABLET ORAL at 16:08

## 2022-08-28 RX ADMIN — OXYCODONE HYDROCHLORIDE 5 MILLIGRAM(S): 5 TABLET ORAL at 01:30

## 2022-08-28 RX ADMIN — Medication 975 MILLIGRAM(S): at 15:00

## 2022-08-28 RX ADMIN — OXYCODONE HYDROCHLORIDE 5 MILLIGRAM(S): 5 TABLET ORAL at 20:35

## 2022-08-28 RX ADMIN — Medication 975 MILLIGRAM(S): at 23:10

## 2022-08-28 RX ADMIN — SIMETHICONE 80 MILLIGRAM(S): 80 TABLET, CHEWABLE ORAL at 11:44

## 2022-08-28 RX ADMIN — FAMOTIDINE 20 MILLIGRAM(S): 10 INJECTION INTRAVENOUS at 17:30

## 2022-08-28 RX ADMIN — Medication 975 MILLIGRAM(S): at 14:12

## 2022-08-28 RX ADMIN — HEPARIN SODIUM 5000 UNIT(S): 5000 INJECTION INTRAVENOUS; SUBCUTANEOUS at 17:30

## 2022-08-28 RX ADMIN — OXYCODONE HYDROCHLORIDE 5 MILLIGRAM(S): 5 TABLET ORAL at 05:47

## 2022-08-28 RX ADMIN — FAMOTIDINE 20 MILLIGRAM(S): 10 INJECTION INTRAVENOUS at 05:47

## 2022-08-28 RX ADMIN — HEPARIN SODIUM 5000 UNIT(S): 5000 INJECTION INTRAVENOUS; SUBCUTANEOUS at 05:47

## 2022-08-28 RX ADMIN — OXYCODONE HYDROCHLORIDE 5 MILLIGRAM(S): 5 TABLET ORAL at 12:15

## 2022-08-28 NOTE — PROGRESS NOTE ADULT - ASSESSMENT
33y/o  POD#2 from rLTCS, Bilateral Salpingectomy, myomectomy, cerclage removal for prior C/S. QBL 1274. H/H 9.0/28.7. Patient recovering well postoperatively.    #Postpartum state  - Continue with po analgesia  - Increase ambulation  - Continue regular diet  - DVT prophylaxis with 5000u Heparin    Barbara Alvarez  PGY-1

## 2022-08-28 NOTE — PROGRESS NOTE ADULT - SUBJECTIVE AND OBJECTIVE BOX
R1 Progress Note    Patient seen and examined at bedside, no acute overnight events. No acute complaints, pain well controlled. Patient is ambulating and tolerating regular diet. Has not yet passed flatus. Garcia is still in place. Denies CP, SOB, N/V, HA, blurred vision, epigastric pain.    Vital Signs Last 24 Hours  T(C): 36.8 (08-27-22 @ 21:59), Max: 36.9 (08-27-22 @ 10:08)  HR: 71 (08-27-22 @ 21:59) (64 - 77)  BP: 104/53 (08-27-22 @ 21:59) (101/58 - 117/58)  RR: 17 (08-27-22 @ 21:59) (17 - 18)  SpO2: 98% (08-27-22 @ 21:59) (97% - 100%)    I&O's Summary    26 Aug 2022 07:01  -  27 Aug 2022 07:00  --------------------------------------------------------  IN: 1375 mL / OUT: 2794 mL / NET: -1419 mL        Physical Exam:  General: NAD  Abdomen: Soft, non-tender, non-distended, fundus firm  Incision: Pfannenstiel incision CDI, subcuticular suture closure  Pelvic: Lochia wnl    Labs:    Blood Type: O Positive  Antibody Screen: Negative  RPR: Negative               9.0    10.07 )-----------( 234      ( 08-27 @ 07:00 )             28.7                10.7   9.18  )-----------( 243      ( 08-26 @ 15:30 )             35.4                11.6   6.31  )-----------( 258      ( 08-26 @ 10:00 )             38.4       MEDICATIONS  (STANDING):  acetaminophen     Tablet .. 975 milliGRAM(s) Oral <User Schedule>  acetaminophen     Tablet .. 975 milliGRAM(s) Oral every 6 hours  acetaminophen   IVPB .. 1000 milliGRAM(s) IV Intermittent every 6 hours  diphtheria/tetanus/pertussis (acellular) Vaccine (ADAcel) 0.5 milliLiter(s) IntraMuscular once  famotidine    Tablet 20 milliGRAM(s) Oral two times a day  heparin   Injectable 5000 Unit(s) SubCutaneous every 12 hours  lactated ringers. 1000 milliLiter(s) (125 mL/Hr) IV Continuous <Continuous>  oxytocin Infusion 333.333 milliUNIT(s)/Min (1000 mL/Hr) IV Continuous <Continuous>    MEDICATIONS  (PRN):  diphenhydrAMINE 25 milliGRAM(s) Oral every 4 hours PRN Rash and/or Itching  diphenhydrAMINE 25 milliGRAM(s) Oral every 6 hours PRN Pruritus  lanolin Ointment 1 Application(s) Topical every 6 hours PRN Sore Nipples  magnesium hydroxide Suspension 30 milliLiter(s) Oral two times a day PRN Constipation  nalbuphine Injectable 2.5 milliGRAM(s) IV Push every 6 hours PRN Pruritus  oxyCODONE    IR 5 milliGRAM(s) Oral once PRN Moderate to Severe Pain (4-10)  oxyCODONE    IR 5 milliGRAM(s) Oral every 3 hours PRN Moderate to Severe Pain (4-10)  simethicone 80 milliGRAM(s) Chew every 4 hours PRN Gas     R1 Progress Note    Patient seen and examined at bedside, no acute overnight events. No acute complaints, pain well controlled. Patient is ambulating and tolerating regular diet. Has passed flatus and had an episode of diarrhea last night that she attributes to the food. Urinating independently. Denies CP, SOB, N/V, HA, blurred vision, epigastric pain.    Vital Signs Last 24 Hours  T(C): 36.8 (08-27-22 @ 21:59), Max: 36.9 (08-27-22 @ 10:08)  HR: 71 (08-27-22 @ 21:59) (64 - 77)  BP: 104/53 (08-27-22 @ 21:59) (101/58 - 117/58)  RR: 17 (08-27-22 @ 21:59) (17 - 18)  SpO2: 98% (08-27-22 @ 21:59) (97% - 100%)    I&O's Summary    26 Aug 2022 07:01  -  27 Aug 2022 07:00  --------------------------------------------------------  IN: 1375 mL / OUT: 2794 mL / NET: -1419 mL        Physical Exam:  General: NAD  Abdomen: Soft, non-tender, non-distended, fundus firm  Incision: Pfannenstiel incision CDI, subcuticular suture closure  Pelvic: Lochia wnl    Labs:    Blood Type: O Positive  Antibody Screen: Negative  RPR: Negative               9.0    10.07 )-----------( 234      ( 08-27 @ 07:00 )             28.7                10.7   9.18  )-----------( 243      ( 08-26 @ 15:30 )             35.4                11.6   6.31  )-----------( 258      ( 08-26 @ 10:00 )             38.4       MEDICATIONS  (STANDING):  acetaminophen     Tablet .. 975 milliGRAM(s) Oral <User Schedule>  acetaminophen     Tablet .. 975 milliGRAM(s) Oral every 6 hours  acetaminophen   IVPB .. 1000 milliGRAM(s) IV Intermittent every 6 hours  diphtheria/tetanus/pertussis (acellular) Vaccine (ADAcel) 0.5 milliLiter(s) IntraMuscular once  famotidine    Tablet 20 milliGRAM(s) Oral two times a day  heparin   Injectable 5000 Unit(s) SubCutaneous every 12 hours  lactated ringers. 1000 milliLiter(s) (125 mL/Hr) IV Continuous <Continuous>  oxytocin Infusion 333.333 milliUNIT(s)/Min (1000 mL/Hr) IV Continuous <Continuous>    MEDICATIONS  (PRN):  diphenhydrAMINE 25 milliGRAM(s) Oral every 4 hours PRN Rash and/or Itching  diphenhydrAMINE 25 milliGRAM(s) Oral every 6 hours PRN Pruritus  lanolin Ointment 1 Application(s) Topical every 6 hours PRN Sore Nipples  magnesium hydroxide Suspension 30 milliLiter(s) Oral two times a day PRN Constipation  nalbuphine Injectable 2.5 milliGRAM(s) IV Push every 6 hours PRN Pruritus  oxyCODONE    IR 5 milliGRAM(s) Oral once PRN Moderate to Severe Pain (4-10)  oxyCODONE    IR 5 milliGRAM(s) Oral every 3 hours PRN Moderate to Severe Pain (4-10)  simethicone 80 milliGRAM(s) Chew every 4 hours PRN Gas

## 2022-08-29 ENCOUNTER — TRANSCRIPTION ENCOUNTER (OUTPATIENT)
Age: 32
End: 2022-08-29

## 2022-08-29 VITALS
RESPIRATION RATE: 18 BRPM | SYSTOLIC BLOOD PRESSURE: 118 MMHG | OXYGEN SATURATION: 98 % | TEMPERATURE: 99 F | DIASTOLIC BLOOD PRESSURE: 58 MMHG | HEART RATE: 75 BPM

## 2022-08-29 RX ORDER — VALACYCLOVIR 500 MG/1
0 TABLET, FILM COATED ORAL
Qty: 0 | Refills: 0 | DISCHARGE

## 2022-08-29 RX ORDER — ACETAMINOPHEN 500 MG
3 TABLET ORAL
Qty: 0 | Refills: 0 | DISCHARGE
Start: 2022-08-29

## 2022-08-29 RX ORDER — PANTOPRAZOLE SODIUM 20 MG/1
1 TABLET, DELAYED RELEASE ORAL
Qty: 0 | Refills: 0 | DISCHARGE

## 2022-08-29 RX ORDER — FERROUS SULFATE 325(65) MG
1 TABLET ORAL
Qty: 0 | Refills: 0 | DISCHARGE

## 2022-08-29 RX ORDER — OXYCODONE HYDROCHLORIDE 5 MG/1
1 TABLET ORAL
Qty: 6 | Refills: 0
Start: 2022-08-29

## 2022-08-29 RX ADMIN — OXYCODONE HYDROCHLORIDE 5 MILLIGRAM(S): 5 TABLET ORAL at 03:00

## 2022-08-29 RX ADMIN — OXYCODONE HYDROCHLORIDE 5 MILLIGRAM(S): 5 TABLET ORAL at 09:56

## 2022-08-29 RX ADMIN — Medication 975 MILLIGRAM(S): at 00:05

## 2022-08-29 RX ADMIN — Medication 975 MILLIGRAM(S): at 05:46

## 2022-08-29 RX ADMIN — OXYCODONE HYDROCHLORIDE 5 MILLIGRAM(S): 5 TABLET ORAL at 02:14

## 2022-08-29 RX ADMIN — Medication 975 MILLIGRAM(S): at 06:14

## 2022-08-29 RX ADMIN — Medication 975 MILLIGRAM(S): at 13:25

## 2022-08-29 RX ADMIN — HEPARIN SODIUM 5000 UNIT(S): 5000 INJECTION INTRAVENOUS; SUBCUTANEOUS at 05:45

## 2022-08-29 RX ADMIN — FAMOTIDINE 20 MILLIGRAM(S): 10 INJECTION INTRAVENOUS at 05:46

## 2022-08-29 RX ADMIN — OXYCODONE HYDROCHLORIDE 5 MILLIGRAM(S): 5 TABLET ORAL at 15:17

## 2022-08-29 NOTE — DISCHARGE NOTE OB - CARE PLAN
Principal Discharge DX:	S/P  section  Assessment and plan of treatment:	Return to normal activities of daily living   1

## 2022-08-29 NOTE — DISCHARGE NOTE OB - PATIENT PORTAL LINK FT
You can access the FollowMyHealth Patient Portal offered by [facility] by registering at the following website: [email or web address]. By joining [employer]’s FollowMyHealth portal, you will also be able to view your health information using other applications (apps) compatible with our system.

## 2022-08-29 NOTE — DISCHARGE NOTE OB - PATERNITY PAPERS COMPLETED PRIOR TO DISCHARGE
Patient was previously on Fluoxetine. Patient would like a refill for Fluoxetine. [facility] on [address] in [place].     Yes

## 2022-08-29 NOTE — DISCHARGE NOTE OB - MATERIALS PROVIDED
Vaccinations/[facility]  Screening Program/  Immunization Record/Breastfeeding Log/Breastfeeding Mother’s Support Group Information/Guide to Postpartum Care/[facility] Hearing Screen Program/Back To Sleep Handout/Shaken Baby Prevention Handout/Breastfeeding Guide and Packet/Birth Certificate Instructions

## 2022-08-29 NOTE — DISCHARGE NOTE OB - MEDICATION SUMMARY - MEDICATIONS TO TAKE
I will START or STAY ON the medications listed below when I get home from the hospital:    Tylenol 325 mg oral tablet  -- 3 tab(s) by mouth 2 times a day  -- Indication: For Pain   I will START or STAY ON the medications listed below when I get home from the hospital:    Tylenol 325 mg oral tablet  -- 3 tab(s) by mouth 2 times a day  -- Indication: For Pain    oxyCODONE 5 mg oral tablet  -- 1 tab(s) by mouth every 6 hours MDD:4 tabs  -- Caution federal law prohibits the transfer of this drug to any person other  than the person for whom it was prescribed.  It is very important that you take or use this exactly as directed.  Do not skip doses or discontinue unless directed by your doctor.  May cause drowsiness or dizziness.  This prescription cannot be refilled.  Using more of this medication than prescribed may cause serious breathing problems.    -- Indication: For Pain

## 2022-08-29 NOTE — PROGRESS NOTE ADULT - SUBJECTIVE AND OBJECTIVE BOX
SUBJECTIVE:    Pain: Controlled    Complaints: None    MILESTONES:    Alert and Oriented x 3  [ x ]  Out of bed/ ambulating. [ x ]  Flatus:   Positive [ X ]  Negative [  ]  Bowel movement  [  ] Positive [  ] Negative   Voiding [x  ] Due to void [  ]   Garcia/Indwelling catheter in place [  ]  Diet: Regular [ x ]  Clears [  ]  NPO [  ]    Infant feeding:  Breast [  ]   Bottle [  ]  Both [ x ]  Feeding related issues and/or concerns:      OBJECTIVE:  T(C): 36.8 (22 @ 05:48), Max: 37.1 (22 @ 22:00)  HR: 65 (22 @ 05:48) (65 - 85)  BP: 104/60 (22 @ 05:48) (104/60 - 120/63)  RR: 18 (22 @ 05:48) (18 - 18)  SpO2: 98% (22 @ 05:48) (98% - 99%)  Wt(kg): --          Blood Type: O Positive    RPR: Negative          MEDICATIONS  (STANDING):  acetaminophen     Tablet .. 975 milliGRAM(s) Oral <User Schedule>  acetaminophen     Tablet .. 975 milliGRAM(s) Oral every 6 hours  acetaminophen   IVPB .. 1000 milliGRAM(s) IV Intermittent every 6 hours  diphtheria/tetanus/pertussis (acellular) Vaccine (ADAcel) 0.5 milliLiter(s) IntraMuscular once  famotidine    Tablet 20 milliGRAM(s) Oral two times a day  heparin   Injectable 5000 Unit(s) SubCutaneous every 12 hours  lactated ringers. 1000 milliLiter(s) (125 mL/Hr) IV Continuous <Continuous>  oxytocin Infusion 333.333 milliUNIT(s)/Min (1000 mL/Hr) IV Continuous <Continuous>    MEDICATIONS  (PRN):  diphenhydrAMINE 25 milliGRAM(s) Oral every 4 hours PRN Rash and/or Itching  diphenhydrAMINE 25 milliGRAM(s) Oral every 6 hours PRN Pruritus  lanolin Ointment 1 Application(s) Topical every 6 hours PRN Sore Nipples  magnesium hydroxide Suspension 30 milliLiter(s) Oral two times a day PRN Constipation  nalbuphine Injectable 2.5 milliGRAM(s) IV Push every 6 hours PRN Pruritus  oxyCODONE    IR 5 milliGRAM(s) Oral every 3 hours PRN Moderate to Severe Pain (4-10)  oxyCODONE    IR 5 milliGRAM(s) Oral once PRN Moderate to Severe Pain (4-10)  simethicone 80 milliGRAM(s) Chew every 4 hours PRN Gas        ASSESSMENT:    32y     G 10     P  2172       PO Day#  3        Delivery: Primary [  ]    Repeat [ X ]   Nicholas Salpingectomy    QBL - 1274                                  Indication of procedure: Previous C/S in Labor    Condition: Stable    Past Medical History significant for: HPI:  PNC Provider:  Dr Overton  EDC:  2022  Rep. C/S & BTL scheduled for 2022.    Patient is a  @ 37 4/7wks gest., with a McDonold's cerclage in place since 3/10/2022,  reporting to triage with  c/o intermittent pelvic pressure/ctx since [address]43.  Reports pain of 7/10 with contractions.  Denies LOF or vaginal bleeding.  Reports good fetal movements.    AP Course:  Bao Lackey's cerclage placed on 3/10/20/2022;  Meds:  pnv, pantoprazole, valtrex & iron  NKDA    PMHx - GERD  PSHx  - Gastric sleeve -            - D&c x 2             OBHx -  PTD @ 23wks gest  (pprom'ed & labor.  had Ferrara's in place for incompetent cervix) -  at [facility].     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    Vaginal: Lochia:  Heavy [  ]  Moderate [ x ]   Scant [  ]    Extremities: Edema [  ] Negative Adalberto's Sign [ X ] Nontender Nicholas  [ x ] Positive pedal pulses [  ]    Other relevant physical exam findings:      PLAN:    Plan: Increase ambulation, analgesia PRN and pain medication protocol standing oxycodone, ibuprofen and acetaminophen.    Diet: Regular diet    Continue routine post-operative and postpartum care.  For  Consult for a Hx of Depression.    Discharge Planning [ x ]    For Discharge Today  [  X  ]    Consults:  Social Work [ X ]  Lactation [ x ]  Other [         ]

## 2022-08-29 NOTE — DISCHARGE NOTE OB - HOSPITAL COURSE
Patient admitted to L&D for labor with eFrrara Cerclage. Cerclages were removed and repeat  section and bilateral salpingectomy performed for sterilization. Live male infant. Procedure and postpartum course uncomplicated.

## 2022-08-29 NOTE — DISCHARGE NOTE OB - CARE PROVIDER_API CALL
Robert Overton (MD)  Obstetrics and Gynecology  [address], [place] [address]  Phone: [phone]  Fax: [phone]  Follow Up Time:

## 2022-08-30 ENCOUNTER — INPATIENT (INPATIENT)
Facility: HOSPITAL | Age: 32
LOS: 0 days | Discharge: HOME | End: 2022-08-31
Attending: OBSTETRICS & GYNECOLOGY | Admitting: OBSTETRICS & GYNECOLOGY

## 2022-08-30 ENCOUNTER — APPOINTMENT (OUTPATIENT)
Dept: OBGYN | Facility: CLINIC | Age: 32
End: 2022-08-30

## 2022-08-30 VITALS
TEMPERATURE: 99 F | HEART RATE: 82 BPM | OXYGEN SATURATION: 97 % | SYSTOLIC BLOOD PRESSURE: 130 MMHG | DIASTOLIC BLOOD PRESSURE: 62 MMHG | RESPIRATION RATE: 18 BRPM

## 2022-08-30 DIAGNOSIS — O14.90 UNSPECIFIED PRE-ECLAMPSIA, UNSPECIFIED TRIMESTER: ICD-10-CM

## 2022-08-30 DIAGNOSIS — K21.9 GASTRO-ESOPHAGEAL REFLUX DISEASE WITHOUT ESOPHAGITIS: Chronic | ICD-10-CM

## 2022-08-30 DIAGNOSIS — Z98.89 OTHER SPECIFIED POSTPROCEDURAL STATES: Chronic | ICD-10-CM

## 2022-08-30 DIAGNOSIS — Z98.84 BARIATRIC SURGERY STATUS: Chronic | ICD-10-CM

## 2022-08-30 LAB
ALBUMIN SERPL ELPH-MCNC: 3.2 G/DL — LOW (ref 3.3–5)
ALP SERPL-CCNC: 116 U/L — SIGNIFICANT CHANGE UP (ref 40–120)
ALT FLD-CCNC: 33 U/L — SIGNIFICANT CHANGE UP (ref 4–33)
ANION GAP SERPL CALC-SCNC: 7 MMOL/L — SIGNIFICANT CHANGE UP (ref 7–14)
APTT BLD: 29 SEC — SIGNIFICANT CHANGE UP (ref 27–36.3)
AST SERPL-CCNC: 45 U/L — HIGH (ref 4–32)
BASOPHILS # BLD AUTO: 0.02 K/UL — SIGNIFICANT CHANGE UP (ref 0–0.2)
BASOPHILS NFR BLD AUTO: 0.3 % — SIGNIFICANT CHANGE UP (ref 0–2)
BILIRUB SERPL-MCNC: 0.2 MG/DL — SIGNIFICANT CHANGE UP (ref 0.2–1.2)
BUN SERPL-MCNC: 4 MG/DL — LOW (ref 7–23)
CALCIUM SERPL-MCNC: 8.6 MG/DL — SIGNIFICANT CHANGE UP (ref 8.4–10.5)
CHLORIDE SERPL-SCNC: 94 MMOL/L — LOW (ref 98–107)
CO2 SERPL-SCNC: 24 MMOL/L — SIGNIFICANT CHANGE UP (ref 22–31)
CREAT SERPL-MCNC: 0.6 MG/DL — SIGNIFICANT CHANGE UP (ref 0.5–1.3)
EGFR: 121 ML/MIN/1.73M2 — SIGNIFICANT CHANGE UP
EOSINOPHIL # BLD AUTO: 0.3 K/UL — SIGNIFICANT CHANGE UP (ref 0–0.5)
EOSINOPHIL NFR BLD AUTO: 5.2 % — SIGNIFICANT CHANGE UP (ref 0–6)
FIBRINOGEN PPP-MCNC: 803 MG/DL — HIGH (ref 330–520)
GLUCOSE SERPL-MCNC: 85 MG/DL — SIGNIFICANT CHANGE UP (ref 70–99)
HCT VFR BLD CALC: 30.2 % — LOW (ref 34.5–45)
HGB BLD-MCNC: 9.4 G/DL — LOW (ref 11.5–15.5)
IANC: 2.98 K/UL — SIGNIFICANT CHANGE UP (ref 1.8–7.4)
IMM GRANULOCYTES NFR BLD AUTO: 0.5 % — SIGNIFICANT CHANGE UP (ref 0–1.5)
INR BLD: 0.95 RATIO — SIGNIFICANT CHANGE UP (ref 0.88–1.16)
LDH SERPL L TO P-CCNC: 242 U/L — HIGH (ref 135–225)
LYMPHOCYTES # BLD AUTO: 2.03 K/UL — SIGNIFICANT CHANGE UP (ref 1–3.3)
LYMPHOCYTES # BLD AUTO: 35.2 % — SIGNIFICANT CHANGE UP (ref 13–44)
MCHC RBC-ENTMCNC: 26.4 PG — LOW (ref 27–34)
MCHC RBC-ENTMCNC: 31.1 GM/DL — LOW (ref 32–36)
MCV RBC AUTO: 84.8 FL — SIGNIFICANT CHANGE UP (ref 80–100)
MONOCYTES # BLD AUTO: 0.4 K/UL — SIGNIFICANT CHANGE UP (ref 0–0.9)
MONOCYTES NFR BLD AUTO: 6.9 % — SIGNIFICANT CHANGE UP (ref 2–14)
NEUTROPHILS # BLD AUTO: 2.98 K/UL — SIGNIFICANT CHANGE UP (ref 1.8–7.4)
NEUTROPHILS NFR BLD AUTO: 51.9 % — SIGNIFICANT CHANGE UP (ref 43–77)
NRBC # BLD: 0 /100 WBCS — SIGNIFICANT CHANGE UP (ref 0–0)
NRBC # FLD: 0 K/UL — SIGNIFICANT CHANGE UP (ref 0–0)
PLATELET # BLD AUTO: 375 K/UL — SIGNIFICANT CHANGE UP (ref 150–400)
POTASSIUM SERPL-MCNC: 4 MMOL/L — SIGNIFICANT CHANGE UP (ref 3.5–5.3)
POTASSIUM SERPL-SCNC: 4 MMOL/L — SIGNIFICANT CHANGE UP (ref 3.5–5.3)
PROT SERPL-MCNC: 6.2 G/DL — SIGNIFICANT CHANGE UP (ref 6–8.3)
PROTHROM AB SERPL-ACNC: 11 SEC — SIGNIFICANT CHANGE UP (ref 10.5–13.4)
RBC # BLD: 3.56 M/UL — LOW (ref 3.8–5.2)
RBC # FLD: 16 % — HIGH (ref 10.3–14.5)
SODIUM SERPL-SCNC: 125 MMOL/L — LOW (ref 135–145)
URATE SERPL-MCNC: 4.6 MG/DL — SIGNIFICANT CHANGE UP (ref 2.5–7)
WBC # BLD: 5.76 K/UL — SIGNIFICANT CHANGE UP (ref 3.8–10.5)
WBC # FLD AUTO: 5.76 K/UL — SIGNIFICANT CHANGE UP (ref 3.8–10.5)

## 2022-08-30 PROCEDURE — 99211 OFF/OP EST MAY X REQ PHY/QHP: CPT | Mod: 24

## 2022-08-30 RX ORDER — MAGNESIUM SULFATE 500 MG/ML
2 VIAL (ML) INJECTION
Qty: 40 | Refills: 0 | Status: DISCONTINUED | OUTPATIENT
Start: 2022-08-30 | End: 2022-08-31

## 2022-08-30 RX ORDER — SODIUM CHLORIDE 9 MG/ML
1000 INJECTION, SOLUTION INTRAVENOUS
Refills: 0 | Status: DISCONTINUED | OUTPATIENT
Start: 2022-08-30 | End: 2022-08-31

## 2022-08-30 RX ORDER — KETOROLAC TROMETHAMINE 30 MG/ML
30 SYRINGE (ML) INJECTION ONCE
Refills: 0 | Status: DISCONTINUED | OUTPATIENT
Start: 2022-08-30 | End: 2022-08-30

## 2022-08-30 RX ORDER — MAGNESIUM SULFATE 500 MG/ML
2 VIAL (ML) INJECTION
Qty: 40 | Refills: 0 | Status: DISCONTINUED | OUTPATIENT
Start: 2022-08-30 | End: 2022-08-30

## 2022-08-30 RX ORDER — MAGNESIUM SULFATE 500 MG/ML
4 VIAL (ML) INJECTION ONCE
Refills: 0 | Status: COMPLETED | OUTPATIENT
Start: 2022-08-30 | End: 2022-08-30

## 2022-08-30 RX ADMIN — SODIUM CHLORIDE 50 MILLILITER(S): 9 INJECTION, SOLUTION INTRAVENOUS at 20:58

## 2022-08-30 RX ADMIN — Medication 50 GM/HR: at 21:18

## 2022-08-30 RX ADMIN — Medication 50 GM/HR: at 21:43

## 2022-08-30 RX ADMIN — Medication 30 MILLIGRAM(S): at 20:15

## 2022-08-30 RX ADMIN — Medication 300 GRAM(S): at 20:58

## 2022-08-30 RX ADMIN — Medication 30 MILLIGRAM(S): at 20:46

## 2022-08-30 NOTE — OB POSTPARTUM TRIAGE NOTE - NSOBPROVIDERNOTE_OBGYN_ALL_OB_FT
Toradol 30mg IVP ordered for Headache pain 7/10.  Will continue to monitor Toradol 30mg IVP ordered for Headache pain 7/10.  Will continue to monitor    2030: pt reports relief from Toradol  plan d/w Dr. Overton   patient to be admitted for mag.

## 2022-08-30 NOTE — PATIENT PROFILE ADULT - FALL HARM RISK - UNIVERSAL INTERVENTIONS
Bed in lowest position, wheels locked, appropriate side rails in place/Call bell, personal items and telephone in reach/Instruct patient to call for assistance before getting out of bed or chair/Non-slip footwear when patient is out of bed/[place] to call system/Physically safe environment - no spills, clutter or unnecessary equipment/Purposeful Proactive Rounding/Room/bathroom lighting operational, light cord in reach

## 2022-08-30 NOTE — OB POSTPARTUM TRIAGE NOTE - FALL HARM RISK - UNIVERSAL INTERVENTIONS
Bed in lowest position, wheels locked, appropriate side rails in place/Call bell, personal items and telephone in reach/Instruct patient to call for assistance before getting out of bed or chair/Non-slip footwear when patient is out of bed/[place] to call system/Physically safe environment - no spills, clutter or unnecessary equipment/Purposeful Proactive Rounding/Room/bathroom lighting operational, light cord in reach

## 2022-08-30 NOTE — OB POSTPARTUM TRIAGE NOTE - NSHPPHYSICALEXAM_GEN_ALL_CORE
VS: 100//60 HR 59-80  Alert and oriented x4   Lungs bilaterally clear   Heart normal rate and rhythm  DTR +2 bilaterally  no pitting edema noted to bilateral lower extremities   positive pedal pulses x2

## 2022-08-30 NOTE — OB POSTPARTUM TRIAGE NOTE - NSHPLABSRESULTS_GEN_ALL_CORE
CBC Full  -  ( 30 Aug 2022 19:26 )  WBC Count : 5.76 K/uL  RBC Count : 3.56 M/uL  Hemoglobin : 9.4 g/dL  Hematocrit : 30.2 %  Platelet Count - Automated : 375 K/uL  Mean Cell Volume : 84.8 fL  Mean Cell Hemoglobin : 26.4 pg  Mean Cell Hemoglobin Concentration : 31.1 gm/dL  Auto Neutrophil # : 2.98 K/uL  Auto Lymphocyte # : 2.03 K/uL  Auto Monocyte # : 0.40 K/uL  Auto Eosinophil # : 0.30 K/uL  Auto Basophil # : 0.02 K/uL  Auto Neutrophil % : 51.9 %  Auto Lymphocyte % : 35.2 %  Auto Monocyte % : 6.9 %  Auto Eosinophil % : 5.2 %  Auto Basophil % : 0.3 %    08-30    125<L>  |  94<L>  |  4<L>  ----------------------------<  85  4.0   |  24  |  0.60    Ca    8.6      30 Aug 2022 19:26    TPro  6.2  /  Alb  3.2<L>  /  TBili  0.2  /  DBili  x   /  AST  45<H>  /  ALT  33  /  AlkPhos  116  08-30    PT/INR - ( 30 Aug 2022 19:26 )   PT: 11.0 sec;   INR: 0.95 ratio         PTT - ( 30 Aug 2022 19:26 )  PTT:29.0 sec    08-30

## 2022-08-30 NOTE — H&P ADULT - HISTORY OF PRESENT ILLNESS
32 year old presents with c/o elevated BP in [facility]. Patient reports having a headache that has not been relieved with tylenol and motirn, states her BP at home was 125/74 and in office it was 160/90. Denies blurry vision, denies sob, denies epigastric pain. Patient s/p repeat  on  but denies any history of htn or pec with pregnancy or delivery.   PMH: Chronic GERD   PSH: Bariatric sx            Lipo/BBL 2019           D&C x2    Allergies: denies   Meds: Pentatropazole 40mg, PNV, motrin, tylenol   Denies hx of anxiety, depression, pp depression

## 2022-08-30 NOTE — H&P ADULT - NSICDXPASTSURGICALHX_GEN_ALL_CORE_FT
PAST SURGICAL HISTORY:  Chronic GERD     History of D&C 2015    S/P bariatric surgery 2012 Gastric sleeve placement ( @ [facility])

## 2022-08-30 NOTE — H&P ADULT - PROBLEM SELECTOR PLAN 1
Plan and labs reviewed with Dr. Overton  Patient reports feeling relief after Toradol   Patient admitted for PP PEC   for magnesium   routine orders  covid pcr sent   support vaccinated  consents signed by patient

## 2022-08-31 ENCOUNTER — TRANSCRIPTION ENCOUNTER (OUTPATIENT)
Age: 32
End: 2022-08-31

## 2022-08-31 ENCOUNTER — APPOINTMENT (OUTPATIENT)
Dept: OBGYN | Facility: CLINIC | Age: 32
End: 2022-08-31

## 2022-08-31 VITALS
TEMPERATURE: 99 F | RESPIRATION RATE: 17 BRPM | DIASTOLIC BLOOD PRESSURE: 56 MMHG | HEART RATE: 76 BPM | SYSTOLIC BLOOD PRESSURE: 117 MMHG | OXYGEN SATURATION: 99 %

## 2022-08-31 LAB
MAGNESIUM SERPL-MCNC: 2.4 MG/DL — SIGNIFICANT CHANGE UP (ref 1.6–2.6)
MAGNESIUM SERPL-MCNC: 3.8 MG/DL — HIGH (ref 1.6–2.6)
SARS-COV-2 RNA SPEC QL NAA+PROBE: SIGNIFICANT CHANGE UP

## 2022-08-31 RX ORDER — ACETAMINOPHEN 500 MG
975 TABLET ORAL EVERY 6 HOURS
Refills: 0 | Status: DISCONTINUED | OUTPATIENT
Start: 2022-08-31 | End: 2022-08-31

## 2022-08-31 RX ADMIN — Medication 975 MILLIGRAM(S): at 06:59

## 2022-08-31 RX ADMIN — Medication 50 GM/HR: at 07:14

## 2022-08-31 RX ADMIN — Medication 975 MILLIGRAM(S): at 05:30

## 2022-08-31 RX ADMIN — SODIUM CHLORIDE 50 MILLILITER(S): 9 INJECTION, SOLUTION INTRAVENOUS at 07:15

## 2022-08-31 NOTE — DISCHARGE NOTE PROVIDER - CARE PROVIDER_API CALL
Robert Overton (MD)  Obstetrics and Gynecology  [address] [place] [address]  Phone: [phone]  Fax: [phone]  Follow Up Time:

## 2022-08-31 NOTE — DISCHARGE NOTE NURSING/CASE MANAGEMENT/SOCIAL WORK - NSDCPEFALRISK_GEN_ALL_CORE
For information on Fall & Injury Prevention, visit: [email or web address] OR  [email or web address] OR  https://www.cdc.gov/steadi/patient.html

## 2022-08-31 NOTE — DISCHARGE NOTE PROVIDER - NSDCFUSCHEDAPPT_GEN_ALL_CORE_FT
Robert Overton  [facility]  OBGYNGM[facility] [address]  Scheduled Appointment: 09/08/2022    Robert Overton  [facility]  [facility]  Scheduled Appointment: 09/08/2022    Gabi Salas  [facility]  OBGYNG[facility] [address]  Scheduled Appointment: 10/20/2022

## 2022-08-31 NOTE — PROGRESS NOTE ADULT - ASSESSMENT
Pt is a 33yo  s/p C/S on  presented to triage with complaints of HA, started on magnesium for possible pp sPEC. Pt's HA has resolved, BPs wnl, labs stable.    #postpartum HA  -s/p 12hrs Mg  -continue PO pain meds for HA  -monitor BPs  -likely d/c home today with f/u outpatient    Claudia, PGY3

## 2022-08-31 NOTE — DIETITIAN INITIAL EVALUATION ADULT - OTHER INFO
Per chart, 33yo  s/p C/S on  presented to triage with complaints of HA, started on magnesium for possible pp sPEC. Pt's HA has resolved, BPs wnl, labs stable. Pre-eclampsia, antepartum.     Nutrition interview: No recent episodes of nausea, vomiting, diarrhea or constipation, BM noted yesterday per Pt. Denies any chewing/swallowing difficulties. No food allergies. Stated UBW: ~210# prior to pregnancy, current wt per Pt, at postpartum doctors visit this week ~240#. No new weight in EMR at present. Food preferences explored and noted. Intake is 50% per pt and as observed at breakfast meal today. Feeding skills: independent. Pt with previous gastric bypass surgery and now eats 50% of meals which is normal for her now.     Provided Pt with education regarding Nutrition therapy for Pre-eclampsia (increase protein intake, lower fat intake, and eat more fruits and vegetables, a list of recommended foods and foods to avoid was also provided). Pt confirmed understanding and compliance to this.

## 2022-08-31 NOTE — DIETITIAN INITIAL EVALUATION ADULT - NSICDXPASTSURGICALHX_GEN_ALL_CORE_FT
PAST SURGICAL HISTORY:  Chronic GERD     History of D&C 2015    S/P bariatric surgery 2012 Gastric sleeve placement ( @ [facility])

## 2022-08-31 NOTE — DIETITIAN INITIAL EVALUATION ADULT - NSFNSPHYEXAMSKINFT_GEN_A_CORE
S:  s/p right RAMOS  Pain controlled  No N/V  No Chest Pain/SOB  Afebrile  Exam:    NAD A& O x3    RLE: +DF/PF/EHL SILT L4/L5/S1  LLE:  +DF/PF/EHL SILT L4/L5/S1    Plan:  Continue standard plan of care  Weight bearing: as tolerated with walker/cane and posterior hip precautions  DVT prophylaxis: SCD/Teds + ASA  Dispo: home today   Intact w/ no pressure injuries noted per RN flowsheets

## 2022-08-31 NOTE — DISCHARGE NOTE NURSING/CASE MANAGEMENT/SOCIAL WORK - PATIENT PORTAL LINK FT
You can access the FollowMyHealth Patient Portal offered by [facility] by registering at the following website: [email or web address]. By joining [email or web address]’s [email or web address] portal, you will also be able to view your health information using other applications (apps) compatible with our system.

## 2022-08-31 NOTE — DISCHARGE NOTE PROVIDER - HOSPITAL COURSE
31yo  readmitted on 22 for headache not relieved with Tylenol and Motrin. Patient was started on Magnesium, which was discontinued after 12 hours. Headache has completely resolved. Denies blurry vision, epigastric pain, RUQ pain, n/v, diarrhea. Patient discharged home with close followup with OB in 2-3 days.

## 2022-08-31 NOTE — PROGRESS NOTE ADULT - SUBJECTIVE AND OBJECTIVE BOX
Patient seen and examined at bedside. Pt's HA resolved. No acute complaints, pain well controlled. Patient is ambulating, voiding spontaneously, passing flatus, and tolerating regular diet. Denies CP, SOB, N/V, blurred vision, epigastric pain.    Vital Signs Last 24 Hours  T(C): 36.8 (08-31-22 @ 06:48), Max: 37 (08-30-22 @ 22:45)  HR: 68 (08-31-22 @ 08:09) (62 - 82)  BP: 119/66 (08-31-22 @ 08:09) (103/54 - 130/62)  RR: 18 (08-31-22 @ 08:09) (17 - 20)  SpO2: 98% (08-31-22 @ 08:09) (97% - 100%)    Physical Exam:  General: NAD  Abdomen: Soft, expected tenderness, non-distended, fundus firm  Incision: Pfannenstiel incision CDI  Pelvic: Lochia wnl    Labs:    Blood Type: O Positive  Antibody Screen: Negative  RPR: Negative               9.4    5.76  )-----------( 375      ( 08-30 @ 19:26 )             30.2                9.0    10.07 )-----------( 234      ( 08-27 @ 07:00 )             28.7                10.7   9.18  )-----------( 243      ( 08-26 @ 15:30 )             35.4         MEDICATIONS  (STANDING):  lactated ringers. 1000 milliLiter(s) (50 mL/Hr) IV Continuous <Continuous>  magnesium sulfate Infusion 2 Gm/Hr (50 mL/Hr) IV Continuous <Continuous>    MEDICATIONS  (PRN):  acetaminophen     Tablet .. 975 milliGRAM(s) Oral every 6 hours PRN Mild Pain (1 - 3), Moderate Pain (4 - 6)

## 2022-09-02 LAB — SURGICAL PATHOLOGY STUDY: SIGNIFICANT CHANGE UP

## 2022-09-08 ENCOUNTER — APPOINTMENT (OUTPATIENT)
Dept: OBGYN | Facility: HOSPITAL | Age: 32
End: 2022-09-08

## 2022-10-17 ENCOUNTER — APPOINTMENT (OUTPATIENT)
Dept: OBGYN | Facility: CLINIC | Age: 32
End: 2022-10-17

## 2022-10-19 ENCOUNTER — APPOINTMENT (OUTPATIENT)
Dept: OBGYN | Facility: CLINIC | Age: 32
End: 2022-10-19

## 2022-10-19 VITALS
HEIGHT: 66 IN | DIASTOLIC BLOOD PRESSURE: 90 MMHG | WEIGHT: 219 LBS | BODY MASS INDEX: 35.2 KG/M2 | HEART RATE: 82 BPM | SYSTOLIC BLOOD PRESSURE: 153 MMHG

## 2022-10-19 PROCEDURE — 0503F POSTPARTUM CARE VISIT: CPT

## 2022-10-19 NOTE — HISTORY OF PRESENT ILLNESS
[Postpartum Follow Up] : postpartum follow up [Repeat C/S] : delivered by  section (repeat) [Boy] : baby is a boy [Infant's Name ___] : [unfilled] [___ Lbs] : [unfilled] lbs [___ Oz] : [unfilled] oz [Circumcised] : circumcised [Living at Home] : is currently living at home [Breastfeeding] : currently nursing [Resumed Menses] : has not resumed her menses [Resumed [place]] : has not resumed intercourse [Intended Contraception] : the patient does not intended to use contraception postpartum [Erythema] : not erythematous [Swelling] : not swollen [Dehiscence] : not dehisced [Back to Normal] : is back to normal in size [Normal] : the vagina was normal [Cervix Sample Taken] : cervical sample not taken for a Pap smear [Examination Of The Breasts] : breasts are normal [Doing Well] : is doing well [None] : None [FreeTextEntry8] : 32 y.o. now P 2162 s/p RLST C/S, myomectomy and bilateral salpingectomy   8/26/22  live " Jose Alberts", 7lbs. 14oz.   now 7 weeks 5 days PP.  [de-identified] : slight skin separation at left pole of incision [de-identified] : 7 weeks PP s/p RLST C/S myomectomy and bilateral  salpingectomy, Cervical insufficiency, , s/p gastric sleeve.  [de-identified] : f/u prn. pt advised to do Kegel exercises to strengthen muscles of pelvic floor, to prevent urine loss.

## 2022-10-20 ENCOUNTER — APPOINTMENT (OUTPATIENT)
Dept: OBGYN | Facility: CLINIC | Age: 32
End: 2022-10-20

## 2022-11-27 NOTE — OB RN PATIENT PROFILE - NS_MODEOFARRIVAL_OBGYN_ALL_OB
Kidney transplant patient from 2004. Pt sent by transplant team for IV fluids. Pt has increased creatinine. Influenza A positive. Nausea, vomiting and diarrhea for one week. Pt had tamiflu.      Triage Assessment     Row Name 11/27/22 125       Triage Assessment (Adult)    Airway WDL WDL       Respiratory WDL    Respiratory WDL X;cough       Skin Circulation/Temperature WDL    Skin Circulation/Temperature WDL WDL       Cardiac WDL    Cardiac WDL X;rhythm  tachycardic       Cognitive/Neuro/Behavioral WDL    Cognitive/Neuro/Behavioral WDL WDL               Car

## 2022-12-14 NOTE — ED ADULT NURSE NOTE - PAIN RATING/NUMBER SCALE (0-10): ACTIVITY
10
33-year-old female presents to ED for cough and pain around her  scar.  Patient had a  about 11 days ago and a cough prior to delivery.  She states when she coughs she feels the pain around her  she also has noticed some white discharge that she got concerned might be infected.  No fever no chills no surrounding erythema.  Normal bowel movements.    Const: nad  Eyes: PERRL, no conjunctival injection  HENT:  Neck supple without meningismus   CV: RRR, Warm, well-perfused extremities  RESP: CTA B/L, no tachypnea   GI: soft, non-tender, non-distended  MSK: No gross deformities appreciated  Skin: Warm, dry. No rashes, Healing  scar with no drainage or tenderness to palpation  Neuro: Alert, CNs II-XII grossly intact. Sensation and motor function of extremities grossly intact.  Psych: Appropriate mood and affect.    labs, gyn

## 2023-04-10 ENCOUNTER — APPOINTMENT (OUTPATIENT)
Dept: OTOLARYNGOLOGY | Facility: CLINIC | Age: 33
End: 2023-04-10

## 2023-04-10 ENCOUNTER — APPOINTMENT (OUTPATIENT)
Dept: ORTHOPEDIC SURGERY | Facility: CLINIC | Age: 33
End: 2023-04-10

## 2023-04-21 ENCOUNTER — APPOINTMENT (OUTPATIENT)
Dept: OBGYN | Facility: CLINIC | Age: 33
End: 2023-04-21
Payer: COMMERCIAL

## 2023-04-21 VITALS
WEIGHT: 219 LBS | DIASTOLIC BLOOD PRESSURE: 84 MMHG | HEIGHT: 66 IN | HEART RATE: 69 BPM | BODY MASS INDEX: 35.2 KG/M2 | SYSTOLIC BLOOD PRESSURE: 138 MMHG

## 2023-04-21 DIAGNOSIS — Z01.411 ENCOUNTER FOR GYNECOLOGICAL EXAMINATION (GENERAL) (ROUTINE) WITH ABNORMAL FINDINGS: ICD-10-CM

## 2023-04-21 PROCEDURE — 99395 PREV VISIT EST AGE 18-39: CPT

## 2023-04-21 RX ORDER — FLUCONAZOLE 150 MG/1
150 TABLET ORAL
Qty: 5 | Refills: 1 | Status: DISCONTINUED | COMMUNITY
Start: 2020-08-21 | End: 2023-04-21

## 2023-04-21 RX ORDER — TERCONAZOLE 80 MG/1
80 SUPPOSITORY VAGINAL
Qty: 1 | Refills: 2 | Status: DISCONTINUED | COMMUNITY
Start: 2020-09-01 | End: 2023-04-21

## 2023-04-21 RX ORDER — FAMOTIDINE 40 MG/1
40 TABLET, FILM COATED ORAL
Qty: 60 | Refills: 3 | Status: DISCONTINUED | COMMUNITY
Start: 2022-03-07 | End: 2023-04-21

## 2023-04-21 RX ORDER — NYSTATIN AND TRIAMCINOLONE ACETONIDE 100000; 1 MG/G; MG/G
100000-0.1 CREAM TOPICAL TWICE DAILY
Qty: 1 | Refills: 1 | Status: DISCONTINUED | COMMUNITY
Start: 2020-09-01 | End: 2023-04-21

## 2023-04-21 RX ORDER — FLUCONAZOLE 150 MG/1
150 TABLET ORAL DAILY
Qty: 1 | Refills: 3 | Status: DISCONTINUED | COMMUNITY
Start: 2020-07-21 | End: 2023-04-21

## 2023-04-21 NOTE — HISTORY OF PRESENT ILLNESS
[FreeTextEntry1] : 33 y.o. P 2162  LNMP none since  delivery in Aug. 2022.  pt is still breastfeeding.  , presents for annual exam. pt feels well, pt is s/p bilateral salpingectomy. PMH GERD on Pantoprazole,  followed by TYRONE Dale. hx of  Hypochormic Microcytic anemia- pt takes Iron supplementation PCP Dr. Mercado. PSHx - Gastric sleeve - 2012. , Liposuction and Fat Transfer - 2020, \par FH - HTN - mother, and father, , Diabetes - father, Prostate ca - father, , \par SH - negative, \par GYN hx -  hx of cervical HPV. 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  hx of recurrent VVC previously followed by Bora Villalobos,  OB hx - C/S x 2.

## 2023-04-21 NOTE — DISCUSSION/SUMMARY
[FreeTextEntry1] : A - WWV\par       GERD\par       Hx of Anemia\par       s/p gastric sleeve\par     s/p bilateral salpingectomy \par     hx of HSV-2\par \par P- f/u 1 year\par      pap done\par     f/u with PCP for hx of Anemia\par     f/u with  G.I. for GERD\par     pt is eating better\par    exercise encouraged.

## 2023-04-24 LAB — HPV HIGH+LOW RISK DNA PNL CVX: NOT DETECTED

## 2023-04-27 ENCOUNTER — APPOINTMENT (OUTPATIENT)
Dept: ORTHOPEDIC SURGERY | Facility: CLINIC | Age: 33
End: 2023-04-27

## 2023-05-03 LAB — CYTOLOGY CVX/VAG DOC THIN PREP: ABNORMAL

## 2023-05-11 ENCOUNTER — APPOINTMENT (OUTPATIENT)
Dept: OBGYN | Facility: CLINIC | Age: 33
End: 2023-05-11
Payer: COMMERCIAL

## 2023-05-11 DIAGNOSIS — L29.0 PRURITUS ANI: ICD-10-CM

## 2023-05-11 PROCEDURE — 99213 OFFICE O/P EST LOW 20 MIN: CPT

## 2023-05-11 RX ORDER — CLOTRIMAZOLE 10 MG/G
1 CREAM TOPICAL TWICE DAILY
Qty: 1 | Refills: 2 | Status: ACTIVE | COMMUNITY
Start: 2023-05-11 | End: 1900-01-01

## 2023-06-21 NOTE — H&P PST ADULT - PRO ANTICIPATED DISCH DISP
Emergency Department Resident Note      Patient: Alma Whitney Age: 8 year old Sex: female   MRN: [id number] : 2015 Encounter Date: 2023       HISTORY OF PRESENT ILLNESS  This is a 8 year old female here for evaluation of dysuria for 3 weeks, and right leg pain.  Patient history of hydronephrosis status post repair about 5 years ago per parents.  Per father patient will still get UTIs, and still has some urine leakage requiring pull-ups.  Per father patient initially arrived into his care after being with mother in [place].  Father noticed that patient was walking funny with a wide-based gait.  This continued for about 2 weeks, until patient told father last night that vaginal pain and burning urination pain had been present for the last 2 days, and had been the reason that she was walking funny. Father then brought patient in for further evaluation. Father also noting some  \"strong smelling\" urine in her pullups. Denies: fevers, cough, congestion at home. Normal PO intake and urine output noted    REVIEW OF SYSTEMS  Review of Systems   Constitutional symptoms:  No fever  Skin symptoms:  No rash.    Eye symptoms:  No discharge  ENMT symptoms:  No nasal congestion  Respiratory symptoms: No cough.   Cardiovascular symptoms:  No peripheral edema  Gastrointestinal symptoms:  No vomiting, no diarrhea, no constipation.  Normal PO intake  Genitourinary symptoms:  Normal urinary output +dysuria  Neurologic symptoms:  No altered level of consciousness  Endocrine symptoms:  No polyuria    PAST HISTORY         Past Medical History:   Diagnosis Date   • Hydronephrosis     No past surgical history on file.          Tobacco Use   • Smokeless tobacco: Never   Vaping Use   • Vaping Use: never used    Family History   Problem Relation Age of Onset   • Cancer Paternal Aunt              Prior to Admission medications    Not on File    Allergies   Allergen Reactions   • Dairy Digestive GI UPSET   • Hoven   (Food)  RASH           Additional Information:     PHYSICAL EXAMINATION  Physical Exam  Blood pressure 114/78, pulse 104, temperature 98.1 °F (36.7 °C), temperature source Oral, resp. rate 24, weight 41.9 kg (92 lb 6 oz), SpO2 100 %.  GEN: Alert, no distress  SKIN: Warm, dry, no rash  Eyes: PERRL, EOMI, normal conjunctiva  ENT: Moist mucous membranes, Tms clear w/o erythema  NECK: Supple, no significant lympadenopathy  CV: Regular rhythm, no murmurs, no edema, normal cap refill  PULM: No increased work of breathing, lungs are clear to auscultation bilaterally  GI: Abdomen is soft, nontender, nondistended  : normal physiologic discharge. No rash noted.   MSK: No tenderness, no swelling. No CVA tenderness  NEURO: Patient is spontaneously moving all limbs and without focal neurologic abnormalities    Labs:   Results for orders placed or performed during the hospital encounter of 06/21/23   Urinalysis With Microscopy Exam W/O C/S   Result Value Ref Range    COLOR, URINALYSIS Straw     APPEARANCE, URINALYSIS Clear     GLUCOSE, URINALYSIS Negative Negative mg/dL    BILIRUBIN, URINALYSIS Negative Negative    KETONES, URINALYSIS Negative Negative mg/dL    SPECIFIC GRAVITY, URINALYSIS 1.027 1.005 - 1.030    OCCULT BLOOD, URINALYSIS Negative Negative    PH, URINALYSIS 6.0 5.0 - 7.0    PROTEIN, URINALYSIS Negative Negative mg/dL    UROBILINOGEN, URINALYSIS 0.2 0.2, 1.0 mg/dL    NITRITE, URINALYSIS Negative Negative    LEUKOCYTE ESTERASE, URINALYSIS Negative Negative    SQUAMOUS EPITHELIAL, URINALYSIS 1 to 5 None Seen, 1 to 5 /hpf    ERYTHROCYTES, URINALYSIS 1 to 2 None Seen, 1 to 2 /hpf    LEUKOCYTES, URINALYSIS 1 to 5 None Seen, 1 to 5 /hpf    BACTERIA, URINALYSIS None Seen None Seen /hpf    HYALINE CASTS, URINALYSIS None Seen None Seen, 1 to 5 /lpf    MUCUS Present      Imaging:   No orders to display     Procedures      MEDICAL DECISION MAKING  This is a 8 year old female.  Hydronephrosis status postrepair, and frequent UTIs  presenting with dysuria for 2 to 3 weeks.  At the UA in the emergency department obtained not concerning for UTI.  Patient otherwise continues to be well-appearing VSS, afebrile, therefore pain may be related to a hygiene issue given the patient is diapers daily. Leg pain appears related to an injury following patient having kicked her desk at school. Will discharge home with recommendations on how to improve hygiene, recommended to switch to cotton underwear, and also for patient to remain well-hydrated.    ED Course as of 06/21/23 0906 Wed Jun 21, 2023   0816 Patient is well appearing at this time. Knee pain appears related to injury after hitting her knee on a desk. Normal ROM, no overlying erythema or tenderness. Will proceed with UA and culture. VSS [ZB]   0851 UA not concerning for UTI. Recommended good hygiene, cotton underwear, staying hydrated. [MC]      ED Course User Index  [MC] Abel Mayo MD  [ZB] Michele Leger     ED Medication Orders (From admission, onward)    None          IMPRESSION AND PLAN  Diagnosis:   1. Dysuria      Disposition: Discharge home in stable condition     I participated in the care of this patient and this note provides additional information during the visit.  Please refer to the attending note for further details regarding history, physical exam, work-up, medical decision making, and disposition.  This note was generated using voice recognition software.     Patient care discussed with attending physician and family    Michele Leger MD  PGY2  Please contact via Perfect serve  [place]: [phone]       Michele Leger  Resident  06/21/23 0906       Michele Leger  Resident  06/21/23 0908     home

## 2023-06-26 ENCOUNTER — NON-APPOINTMENT (OUTPATIENT)
Age: 33
End: 2023-06-26

## 2023-09-13 ENCOUNTER — EMERGENCY (EMERGENCY)
Facility: HOSPITAL | Age: 33
LOS: 1 days | Discharge: HOME | End: 2023-09-13
Attending: STUDENT IN AN ORGANIZED HEALTH CARE EDUCATION/TRAINING PROGRAM | Admitting: STUDENT IN AN ORGANIZED HEALTH CARE EDUCATION/TRAINING PROGRAM
Payer: OTHER MISCELLANEOUS

## 2023-09-13 VITALS
TEMPERATURE: 98 F | HEART RATE: 72 BPM | RESPIRATION RATE: 18 BRPM | SYSTOLIC BLOOD PRESSURE: 134 MMHG | DIASTOLIC BLOOD PRESSURE: 80 MMHG | OXYGEN SATURATION: 100 %

## 2023-09-13 DIAGNOSIS — Z98.84 BARIATRIC SURGERY STATUS: Chronic | ICD-10-CM

## 2023-09-13 DIAGNOSIS — K21.9 GASTRO-ESOPHAGEAL REFLUX DISEASE WITHOUT ESOPHAGITIS: Chronic | ICD-10-CM

## 2023-09-13 DIAGNOSIS — Z98.89 OTHER SPECIFIED POSTPROCEDURAL STATES: Chronic | ICD-10-CM

## 2023-09-13 PROCEDURE — 99283 EMERGENCY DEPT VISIT LOW MDM: CPT

## 2023-09-13 NOTE — ED PROVIDER NOTE - CLINICAL SUMMARY MEDICAL DECISION MAKING FREE TEXT BOX
33-year-old female no past medical history now presents after needlestick injury to right thumb.  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  Patient reports injuring her right thumb with the tip/edge of IUD. Hemodyn stable. Decline PEP. Sent labs, provided information to follow up with S.

## 2023-09-13 NOTE — ED PROVIDER NOTE - ATTENDING CONTRIBUTION TO CARE
No past medical history.  Patient is here for fingerstick to right thumb.  No bleeding at this time.  Patient reports he washed her wound extensively after it happened.  States blood testing was sent on the person she was exposed to.  The person she was exposed to  had HIV testing done in December 2022 which was negative and did not have concern for HIV at this time.  Patient herself is breast-feeding at this time.  Otherwise patient states she is at her baseline health.  Denies chest pain, shortness of breath, fevers, chills.  Patient is well-appearing.  No distress.  Very mild puncture wound to right thumb.  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    Had an extensive conversation regarding starting HIV prophylaxis treatment.  Risk and benefits are discussed.  Patient does not want to start the medication at this time.  Strict return precautions are discussed.  Patient is instructed to return to an emergency department if she changes her mind at any time.

## 2023-09-13 NOTE — ED PROVIDER NOTE - PROGRESS NOTE DETAILS
Ariel LOVE: Declined PEP prophylaxis.  Labs sent, package given for patient to report to nursing manager/EHS.

## 2023-09-13 NOTE — ED PROVIDER NOTE - OBJECTIVE STATEMENT
33-year-old female no past medical history now presents after needlestick injury to right thumb.  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  Patient reports injuring her right thumb with the tip/edge of IUD.  Reports IUD was visibly stained, noticed small dot of blood on right thumb, squeezed blood out of her finger, washed it under running tap water for few seconds, applied bandage, took no medications.  Patient is currently breast-feeding, has no medication allergies. Verbally reports patient having no communicable diseases.

## 2023-09-13 NOTE — ED PROVIDER NOTE - PATIENT PORTAL LINK FT
You can access the FollowMyHealth Patient Portal offered by [facility] by registering at the following website: [email or web address]. By joining [facility]’s FollowMyHealth portal, you will also be able to view your health information using other applications (apps) compatible with our system.

## 2023-09-13 NOTE — ED ADULT NURSE REASSESSMENT NOTE - NS ED NURSE REASSESS COMMENT FT1
Pt awake and alert, in no acute distress presenting to Ed after blood exposure. 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 No visible puncture wound/ bleeding. Labs sent. Pt received blood exposure package and information to take home.

## 2023-09-13 NOTE — ED PROVIDER NOTE - NS ED ROS FT
CONSTITUTIONAL: No weakness, fevers or chills  EYES/ENT: No visual changes;  No vertigo or throat pain   NECK: No pain or stiffness  RESPIRATORY: No cough, shortness of breath  CARDIOVASCULAR: No chest pain or palpitations  GASTROINTESTINAL: No abdominal or epigastric pain. No nausea, vomiting,  diarrhea or constipation.   GENITOURINARY: No dysuria, frequency   NEUROLOGICAL: No numbness or weakness  SKIN: No itching, burning, rashes, or lesions     All other review of systems is negative unless indicated above.

## 2023-09-13 NOTE — ED ADULT TRIAGE NOTE - PAIN: PRESENCE, MLM
denies pain/discomfort (Rating = 0) normal/airway patent/breath sounds equal/good air movement/respirations non-labored/clear to auscultation bilaterally

## 2023-09-13 NOTE — ED ADULT TRIAGE NOTE - CHIEF COMPLAINT QUOTE
Pt presents to ED ambulatory from work with c/o needle stick to R thumb. 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 Pt endorses washing her hands after.

## 2023-09-29 DIAGNOSIS — R10.2 PELVIC AND PERINEAL PAIN: ICD-10-CM

## 2023-10-01 PROBLEM — L29.0 PERIANAL PRURITUS: Status: ACTIVE | Noted: 2023-05-11

## 2023-10-03 ENCOUNTER — ASOB RESULT (OUTPATIENT)
Age: 33
End: 2023-10-03

## 2023-10-03 ENCOUNTER — APPOINTMENT (OUTPATIENT)
Dept: OBGYN | Facility: CLINIC | Age: 33
End: 2023-10-03
Payer: COMMERCIAL

## 2023-10-03 PROCEDURE — 76830 TRANSVAGINAL US NON-OB: CPT

## 2023-10-11 LAB — BACTERIA UR CULT: NORMAL

## 2023-10-12 DIAGNOSIS — B37.31 ACUTE CANDIDIASIS OF VULVA AND VAGINA: ICD-10-CM

## 2023-10-13 ENCOUNTER — TRANSCRIPTION ENCOUNTER (OUTPATIENT)
Age: 33
End: 2023-10-13

## 2023-10-13 LAB
CANDIDA VAG CYTO: NOT DETECTED
G VAGINALIS+PREV SP MTYP VAG QL MICRO: NOT DETECTED
T VAGINALIS VAG QL WET PREP: NOT DETECTED

## 2023-10-16 ENCOUNTER — APPOINTMENT (OUTPATIENT)
Dept: OBGYN | Facility: CLINIC | Age: 33
End: 2023-10-16

## 2023-10-31 ENCOUNTER — APPOINTMENT (OUTPATIENT)
Dept: GASTROENTEROLOGY | Facility: CLINIC | Age: 33
End: 2023-10-31

## 2024-02-29 ENCOUNTER — APPOINTMENT (OUTPATIENT)
Dept: DERMATOLOGY | Facility: CLINIC | Age: 34
End: 2024-02-29
Payer: COMMERCIAL

## 2024-02-29 DIAGNOSIS — L81.0 POSTINFLAMMATORY HYPERPIGMENTATION: ICD-10-CM

## 2024-02-29 PROCEDURE — 99204 OFFICE O/P NEW MOD 45 MIN: CPT | Mod: 25

## 2024-02-29 PROCEDURE — 11900 INJECT SKIN LESIONS </W 7: CPT

## 2024-02-29 RX ORDER — VALACYCLOVIR 500 MG/1
500 TABLET, FILM COATED ORAL TWICE DAILY
Qty: 1 | Refills: 3 | Status: ACTIVE | COMMUNITY
Start: 2022-08-05 | End: 1900-01-01

## 2024-02-29 RX ORDER — TRETINOIN 0.25 MG/G
0.03 CREAM TOPICAL
Qty: 1 | Refills: 2 | Status: ACTIVE | COMMUNITY
Start: 2024-02-29 | End: 1900-01-01

## 2024-03-26 ENCOUNTER — APPOINTMENT (OUTPATIENT)
Dept: GASTROENTEROLOGY | Facility: CLINIC | Age: 34
End: 2024-03-26
Payer: COMMERCIAL

## 2024-03-26 DIAGNOSIS — K21.9 GASTRO-ESOPHAGEAL REFLUX DISEASE W/OUT ESOPHAGITIS: ICD-10-CM

## 2024-03-26 DIAGNOSIS — K59.09 OTHER CONSTIPATION: ICD-10-CM

## 2024-03-26 PROCEDURE — 99214 OFFICE O/P EST MOD 30 MIN: CPT

## 2024-03-26 NOTE — REASON FOR VISIT
[Home] : at home, [unfilled] , at the time of the visit. [Medical Office: ([facility])___] : at the medical office located in  [Patient] : the patient

## 2024-03-26 NOTE — REVIEW OF SYSTEMS
[Fever] : no fever [Sore Throat] : no sore throat [Chills] : no chills [Chest Pain] : no chest pain [SOB on Exertion] : no shortness of breath during exertion [As Noted in HPI] : as noted in HPI [Rash] : no rash [Dizziness] : no dizziness [Joint Stiffness] : no joint stiffness [Muscle Weakness] : no muscle weakness [Anxiety] : no anxiety [Easy Bruising] : no tendency for easy bruising

## 2024-03-26 NOTE — HISTORY OF PRESENT ILLNESS
[FreeTextEntry1] : 32 yo female with h/o chronic GERD, s/p gastric sleeve. patient unable to stop pantoprazole ,taking it for years, with control of reflux unless she is noncompliant with diet. intentional weight loss..  normal bms, no brbpr, no melena. pt requesting more ppi or possible surgery for  gerd.   s/p egd in 2020 gastric sleeve, no h.pylori  s/p colonoscopy reveals internal hemorrhoids

## 2024-03-26 NOTE — ASSESSMENT
[FreeTextEntry1] : GERD s/p gastric sleeve, noncompliant with diet.  plan recommend strict antireflux diet    wean off ppi to famotidine bid if patient unable to wean off ppi,  pt to consider gastric bypass.

## 2024-03-27 ENCOUNTER — APPOINTMENT (OUTPATIENT)
Dept: OBGYN | Facility: CLINIC | Age: 34
End: 2024-03-27
Payer: COMMERCIAL

## 2024-03-27 VITALS
WEIGHT: 210 LBS | BODY MASS INDEX: 33.75 KG/M2 | DIASTOLIC BLOOD PRESSURE: 69 MMHG | HEIGHT: 66 IN | SYSTOLIC BLOOD PRESSURE: 126 MMHG | HEART RATE: 79 BPM

## 2024-03-27 DIAGNOSIS — N76.0 ACUTE VAGINITIS: ICD-10-CM

## 2024-03-27 DIAGNOSIS — B96.89 ACUTE VAGINITIS: ICD-10-CM

## 2024-03-27 PROCEDURE — 99213 OFFICE O/P EST LOW 20 MIN: CPT

## 2024-03-27 RX ORDER — DOXYCYCLINE HYCLATE 100 MG/1
100 CAPSULE ORAL
Qty: 90 | Refills: 0 | Status: DISCONTINUED | COMMUNITY
Start: 2024-02-29 | End: 2024-03-27

## 2024-03-27 RX ORDER — METRONIDAZOLE 7.5 MG/G
0.75 GEL VAGINAL
Qty: 1 | Refills: 0 | Status: DISCONTINUED | COMMUNITY
Start: 2023-10-12 | End: 2024-03-27

## 2024-03-27 RX ORDER — PANTOPRAZOLE 40 MG/1
40 TABLET, DELAYED RELEASE ORAL
Qty: 180 | Refills: 1 | Status: ACTIVE | COMMUNITY
Start: 2022-04-27 | End: 1900-01-01

## 2024-03-27 RX ORDER — NORGESTIMATE AND ETHINYL ESTRADIOL 7DAYSX3 28
0.18/0.215/0.25 KIT ORAL
Qty: 3 | Refills: 3 | Status: DISCONTINUED | COMMUNITY
Start: 2018-12-31 | End: 2024-03-27

## 2024-03-27 RX ORDER — FLUCONAZOLE 150 MG/1
150 TABLET ORAL
Qty: 2 | Refills: 1 | Status: DISCONTINUED | COMMUNITY
Start: 2023-10-12 | End: 2024-03-27

## 2024-03-27 RX ORDER — IBUPROFEN 800 MG/1
800 TABLET, FILM COATED ORAL EVERY 8 HOURS
Qty: 30 | Refills: 1 | Status: DISCONTINUED | COMMUNITY
Start: 2020-06-01 | End: 2024-03-27

## 2024-03-27 NOTE — PLAN
[FreeTextEntry1] : Cultures collected Will Rx PO Metronidazole BV prevention reviewed Will f/u pending cultures

## 2024-03-27 NOTE — HISTORY OF PRESENT ILLNESS
[FreeTextEntry1] : This 34 yo presents c/o about 2-3 days of a vulvo-vaginal discharge, irritation and itch, no treatments tried, no issues with voiding, new partner, requests vaginal STD screen; no other issues voiced.

## 2024-03-27 NOTE — PHYSICAL EXAM
[Labia Majora] : normal [Labia Minora] : normal [Moderate] : moderate [White] : white [Discharge] : a  ~M vaginal discharge was present [No Bleeding] : There was no active vaginal bleeding [Thin] : thin [Normal] : normal

## 2024-03-28 LAB
C TRACH RRNA SPEC QL NAA+PROBE: NOT DETECTED
CANDIDA VAG CYTO: DETECTED
G VAGINALIS+PREV SP MTYP VAG QL MICRO: NOT DETECTED
N GONORRHOEA RRNA SPEC QL NAA+PROBE: NOT DETECTED
SOURCE AMPLIFICATION: NORMAL
T VAGINALIS VAG QL WET PREP: NOT DETECTED

## 2024-03-28 RX ORDER — FLUCONAZOLE 150 MG/1
150 TABLET ORAL
Qty: 7 | Refills: 0 | Status: ACTIVE | COMMUNITY
Start: 2024-03-28 | End: 1900-01-01

## 2024-04-18 ENCOUNTER — APPOINTMENT (OUTPATIENT)
Dept: DERMATOLOGY | Facility: CLINIC | Age: 34
End: 2024-04-18
Payer: COMMERCIAL

## 2024-04-18 DIAGNOSIS — L29.9 PRURITUS, UNSPECIFIED: ICD-10-CM

## 2024-04-18 DIAGNOSIS — L70.9 ACNE, UNSPECIFIED: ICD-10-CM

## 2024-04-18 DIAGNOSIS — L65.9 NONSCARRING HAIR LOSS, UNSPECIFIED: ICD-10-CM

## 2024-04-18 DIAGNOSIS — L73.9 FOLLICULAR DISORDER, UNSPECIFIED: ICD-10-CM

## 2024-04-18 PROCEDURE — 11900 INJECT SKIN LESIONS </W 7: CPT

## 2024-04-18 PROCEDURE — 99214 OFFICE O/P EST MOD 30 MIN: CPT | Mod: 25

## 2024-04-18 NOTE — HISTORY OF PRESENT ILLNESS
[FreeTextEntry1] : hair loss and acne [de-identified] : Ms. BISHOP VIGIL  is a 33 year  y/o F  here for evaluation of below   # Acne on the face for over a year. Using OTC skin care with retinol and Sal acid without improvement. Bothersome brown spots after the acne. Using SPF daily.   # Hair loss on anterior hairline and temporal hairline for years, progressing. Previously used tight hairstyles but has been trying to minimize it and use loose styles lately. The area is sometimes tender and feels "bumpy".  Had ILK x1. started rogaine.  No other changing or concerning lesions. No itchy, growing, bleeding, painful, or changing moles.  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

## 2024-04-18 NOTE — PHYSICAL EXAM
[FreeTextEntry3] : AAOx3, NAD, well-appearing / pleasant Focused body exam only (see below) per patient request: thinning of the hair density on the frontal and fronto-temporal scalp, no erythema or scale, exam partially limited by braided style and hair/wig glue.  - Several scattered comedones and inflammatory papules on the face with PIH

## 2024-04-18 NOTE — ASSESSMENT
[FreeTextEntry1] : #Acne vulgaris, face, mild-moderate, some inflamed lesions and #PIH - Pt oriented about chronic nature of condition, treatment alternatives, risks and benefits - recommend use of sunscreen SPF 30 or above daily - Start tretinoin 0.025% cream to face qHS. Recommend starting every third night and increasing as tolerated. SED including irritation. - S/p doxy; SED  # Hair loss, frontotemporal scalp, chronic, progressing - ddx includes traction alopecia vs other scarring alopecia - given tenderness will recommend ILK today - cont w loose styles - Intralesional injection of Kenalog, 2.5 mg/ml A total of 1 ml was injected along areas of thinning on the frontotemporal scalp/hairline.  The risks/benefits/alternatives of intralesional steroid injections were reviewed with the patient which include but are not limited to pain, atrophy, and dyspigmentation. Intralesional injections were performed in the affected area. The patient tolerated the procedure well.  2 of 4 today  RTC 6 weeks

## 2024-05-30 ENCOUNTER — APPOINTMENT (OUTPATIENT)
Dept: DERMATOLOGY | Facility: CLINIC | Age: 34
End: 2024-05-30

## 2024-07-10 RX ORDER — NORGESTIMATE AND ETHINYL ESTRADIOL 7DAYSX3 28
0.18/0.215/0.25 KIT ORAL
Qty: 84 | Refills: 3 | Status: ACTIVE | COMMUNITY
Start: 2024-07-10 | End: 1900-01-01

## 2024-07-15 NOTE — OB RN TRIAGE NOTE - BP NONINVASIVE SYSTOLIC (MM HG)
POC reviewed with pt, verbalized understanding. Pt AAO x 4, able to make needs known. Meds given per MAR. IV intact and patent. Glucose monitored- no coverage needed. Tele in place and being monitored.  Safety maintained with side rails up x2, bed locked and in lowest positioned, call light in reach. Pt educated to call for assistance with ambulation, verbalized understanding. Pt remains free of falls. No further needs expressed at this time. Will continue to monitor.    Speaking Coherently 111

## 2024-07-23 ENCOUNTER — APPOINTMENT (OUTPATIENT)
Dept: OBGYN | Facility: CLINIC | Age: 34
End: 2024-07-23
Payer: COMMERCIAL

## 2024-07-23 VITALS
WEIGHT: 194 LBS | HEART RATE: 77 BPM | DIASTOLIC BLOOD PRESSURE: 72 MMHG | HEIGHT: 66 IN | SYSTOLIC BLOOD PRESSURE: 113 MMHG | BODY MASS INDEX: 31.18 KG/M2

## 2024-07-23 DIAGNOSIS — Z01.419 ENCOUNTER FOR GYNECOLOGICAL EXAMINATION (GENERAL) (ROUTINE) W/OUT ABNORMAL FINDINGS: ICD-10-CM

## 2024-07-23 PROCEDURE — 99395 PREV VISIT EST AGE 18-39: CPT

## 2024-07-23 PROCEDURE — 99459 PELVIC EXAMINATION: CPT

## 2024-07-23 NOTE — HISTORY OF PRESENT ILLNESS
[FreeTextEntry1] : 34 y.o. P 2162  LNMP, 7/10/24 presents for annual exam,. PMH- GERD on Pantoprazole  followed by Dr. Dale. RENATA- has Hematology appt. today for I.V. Venofer.  PSHx-  Gastric Sleeve- 2012,  Liposuction and Fat Transfer- 2020 FH- HTN- mother and father, , Diabetes- father, Prostate ca- father,  SH- negative, GYN hx  - hx of cervical HPV,  ETOP  x 6,  HSV-2  no recent outbreaks,  hx of recurrent VVC previously followed by LOVELY Villalobos M.D.,  OB hx - C/S x 2 s/p BTL  PCP - Dr. Mercado

## 2024-07-23 NOTE — PHYSICAL EXAM
[Chaperone Present] : A chaperone was present in the examining room during all aspects of the physical examination [[id number]] : A chaperone was present during the pelvic exam. [FreeTextEntry2] : Michelle Morrell [Appropriately responsive] : appropriately responsive [Alert] : alert [No Acute Distress] : no acute distress [No Lymphadenopathy] : no lymphadenopathy [Regular Rate Rhythm] : regular rate rhythm [No Murmurs] : no murmurs [Clear to Auscultation B/L] : clear to auscultation bilaterally [Soft] : soft [Non-tender] : non-tender [Non-distended] : non-distended [No HSM] : No HSM [No Lesions] : no lesions [No Mass] : no mass [Oriented x3] : oriented x3 [Examination Of The Breasts] : a normal appearance [No Masses] : no breast masses were palpable [Labia Majora] : normal [Labia Minora] : normal [Normal] : normal [Anteversion] : anteverted [Uterine Adnexae] : normal

## 2024-07-23 NOTE — DISCUSSION/SUMMARY
[FreeTextEntry1] : A - WWV      hx of HSV-2 no recent outbreaks      GERD      Anemia  p- f/u 1 year     pap and STI testing done      pt to f/u with PCP re: anemia and hematology as well for consideration for I.V. Fe2+     pt to f/u with G.I . re: GERD     exercise  encouraged     nutritional counseling  provided,

## 2024-07-24 LAB
BACTERIA UR CULT: NORMAL
BASOPHILS # BLD AUTO: 0.03 K/UL
BASOPHILS NFR BLD AUTO: 0.7 %
BV BACTERIA RRNA VAG QL NAA+PROBE: NOT DETECTED
C GLABRATA RNA VAG QL NAA+PROBE: NOT DETECTED
C TRACH RRNA SPEC QL NAA+PROBE: NOT DETECTED
C TRACH RRNA SPEC QL NAA+PROBE: NOT DETECTED
CANDIDA RRNA VAG QL PROBE: NOT DETECTED
EOSINOPHIL # BLD AUTO: 0.13 K/UL
EOSINOPHIL NFR BLD AUTO: 3.1 %
HBV SURFACE AG SER QL: NONREACTIVE
HCT VFR BLD CALC: 36.5 %
HCV AB SER QL: NONREACTIVE
HCV S/CO RATIO: 0.1 S/CO
HGB BLD-MCNC: 10.6 G/DL
HIV1+2 AB SPEC QL IA.RAPID: NONREACTIVE
HPV HIGH+LOW RISK DNA PNL CVX: NOT DETECTED
IMM GRANULOCYTES NFR BLD AUTO: 0.2 %
LYMPHOCYTES # BLD AUTO: 1.73 K/UL
LYMPHOCYTES NFR BLD AUTO: 41.4 %
MAN DIFF?: NORMAL
MCHC RBC-ENTMCNC: 25.2 PG
MCHC RBC-ENTMCNC: 29 GM/DL
MCV RBC AUTO: 86.7 FL
MONOCYTES # BLD AUTO: 0.34 K/UL
MONOCYTES NFR BLD AUTO: 8.1 %
N GONORRHOEA RRNA SPEC QL NAA+PROBE: NOT DETECTED
N GONORRHOEA RRNA SPEC QL NAA+PROBE: NOT DETECTED
NEUTROPHILS # BLD AUTO: 1.94 K/UL
NEUTROPHILS NFR BLD AUTO: 46.5 %
PLATELET # BLD AUTO: 331 K/UL
RBC # BLD: 4.21 M/UL
RBC # FLD: 13 %
SOURCE AMPLIFICATION: NORMAL
T PALLIDUM AB SER QL IA: NEGATIVE
T VAGINALIS RRNA SPEC QL NAA+PROBE: NOT DETECTED
WBC # FLD AUTO: 4.18 K/UL

## 2024-07-30 LAB — CYTOLOGY CVX/VAG DOC THIN PREP: NORMAL

## 2024-08-15 ENCOUNTER — APPOINTMENT (OUTPATIENT)
Age: 34
End: 2024-08-15

## 2024-08-21 DIAGNOSIS — Z23 ENCOUNTER FOR IMMUNIZATION: ICD-10-CM

## 2024-08-22 ENCOUNTER — APPOINTMENT (OUTPATIENT)
Dept: OBGYN | Facility: CLINIC | Age: 34
End: 2024-08-22
Payer: COMMERCIAL

## 2024-08-22 PROCEDURE — 99211 OFF/OP EST MAY X REQ PHY/QHP: CPT

## 2024-09-11 ENCOUNTER — APPOINTMENT (OUTPATIENT)
Dept: OBGYN | Facility: CLINIC | Age: 34
End: 2024-09-11
Payer: COMMERCIAL

## 2024-09-11 VITALS
BODY MASS INDEX: 30.37 KG/M2 | DIASTOLIC BLOOD PRESSURE: 77 MMHG | HEIGHT: 66 IN | HEART RATE: 77 BPM | WEIGHT: 189 LBS | SYSTOLIC BLOOD PRESSURE: 115 MMHG

## 2024-09-11 DIAGNOSIS — N89.8 OTHER SPECIFIED NONINFLAMMATORY DISORDERS OF VAGINA: ICD-10-CM

## 2024-09-11 PROCEDURE — 99459 PELVIC EXAMINATION: CPT

## 2024-09-11 PROCEDURE — 99214 OFFICE O/P EST MOD 30 MIN: CPT

## 2024-09-12 ENCOUNTER — APPOINTMENT (OUTPATIENT)
Age: 34
End: 2024-09-12

## 2024-09-12 DIAGNOSIS — B96.89 ACUTE VAGINITIS: ICD-10-CM

## 2024-09-12 DIAGNOSIS — N76.0 ACUTE VAGINITIS: ICD-10-CM

## 2024-09-12 PROBLEM — N89.8 VAGINAL IRRITATION: Status: ACTIVE | Noted: 2024-09-12

## 2024-09-12 LAB
BV BACTERIA RRNA VAG QL NAA+PROBE: DETECTED
C GLABRATA RNA VAG QL NAA+PROBE: NOT DETECTED
C TRACH RRNA SPEC QL NAA+PROBE: NOT DETECTED
CANDIDA RRNA VAG QL PROBE: NOT DETECTED
N GONORRHOEA RRNA SPEC QL NAA+PROBE: NOT DETECTED
T VAGINALIS RRNA SPEC QL NAA+PROBE: NOT DETECTED

## 2024-09-12 RX ORDER — METRONIDAZOLE 7.5 MG/G
0.75 GEL VAGINAL
Qty: 1 | Refills: 0 | Status: ACTIVE | COMMUNITY
Start: 2024-09-12 | End: 1900-01-01

## 2024-09-12 RX ORDER — NYSTATIN 100000 [USP'U]/G
100000 CREAM TOPICAL TWICE DAILY
Qty: 1 | Refills: 0 | Status: ACTIVE | COMMUNITY
Start: 2024-09-12 | End: 1900-01-01

## 2024-09-12 RX ORDER — TRIAMCINOLONE ACETONIDE 1 MG/G
0.1 CREAM TOPICAL TWICE DAILY
Qty: 1 | Refills: 0 | Status: ACTIVE | COMMUNITY
Start: 2024-09-12 | End: 1900-01-01

## 2024-09-13 NOTE — HISTORY OF PRESENT ILLNESS
[FreeTextEntry1] : BISHOP VIGIL is a 34 year old patient  LMP 7/10/24 presenting for a follow-up visit. Pt c/o vaginal discharge and unprotected sexual intercourse

## 2024-09-13 NOTE — PHYSICAL EXAM
[Chaperone Present] : A chaperone was present in the examining room during all aspects of the physical examination [42849] : A chaperone was present during the pelvic exam. [FreeTextEntry2] : SANG [Appropriately responsive] : appropriately responsive [Alert] : alert [No Acute Distress] : no acute distress [No Lymphadenopathy] : no lymphadenopathy [No Murmurs] : no murmurs [Soft] : soft [Non-tender] : non-tender [Non-distended] : non-distended [No HSM] : No HSM [No Lesions] : no lesions [No Mass] : no mass [Oriented x3] : oriented x3 [Labia Majora] : normal [Labia Minora] : normal [Discharge] : a  ~M vaginal discharge was present [Scant] : scant [White] : white [Thin] : thin [Normal] : normal [Uterine Adnexae] : normal

## 2024-09-13 NOTE — PHYSICAL EXAM
[Chaperone Present] : A chaperone was present in the examining room during all aspects of the physical examination [13423] : A chaperone was present during the pelvic exam. [FreeTextEntry2] : SANG [Appropriately responsive] : appropriately responsive [Alert] : alert [No Acute Distress] : no acute distress [No Lymphadenopathy] : no lymphadenopathy [No Murmurs] : no murmurs [Soft] : soft [Non-tender] : non-tender [Non-distended] : non-distended [No HSM] : No HSM [No Lesions] : no lesions [No Mass] : no mass [Oriented x3] : oriented x3 [Labia Majora] : normal [Labia Minora] : normal [Discharge] : a  ~M vaginal discharge was present [Scant] : scant [White] : white [Thin] : thin [Normal] : normal [Uterine Adnexae] : normal

## 2024-12-13 LAB
C TRACH RRNA SPEC QL NAA+PROBE: NOT DETECTED
HBV SURFACE AG SER QL: NONREACTIVE
HCV AB SER QL: NONREACTIVE
HCV S/CO RATIO: 0.08 S/CO
HIV1+2 AB SPEC QL IA.RAPID: NONREACTIVE
N GONORRHOEA RRNA SPEC QL NAA+PROBE: NOT DETECTED
SOURCE AMPLIFICATION: NORMAL
T PALLIDUM AB SER QL IA: NEGATIVE

## 2025-01-17 LAB
INFLUENZA A RESULT: NOT DETECTED
INFLUENZA B RESULT: NOT DETECTED
RESP SYN VIRUS RESULT: NOT DETECTED
SARS-COV-2 RESULT: DETECTED

## 2025-03-10 DIAGNOSIS — N89.8 OTHER SPECIFIED NONINFLAMMATORY DISORDERS OF VAGINA: ICD-10-CM

## 2025-03-12 LAB
BV BACTERIA RRNA VAG QL NAA+PROBE: NOT DETECTED
C GLABRATA RNA VAG QL NAA+PROBE: NOT DETECTED
C TRACH RRNA SPEC QL NAA+PROBE: NOT DETECTED
CANDIDA RRNA VAG QL PROBE: NOT DETECTED
N GONORRHOEA RRNA SPEC QL NAA+PROBE: NOT DETECTED
T VAGINALIS RRNA SPEC QL NAA+PROBE: NOT DETECTED

## 2025-04-30 NOTE — ASU DISCHARGE PLAN (ADULT/PEDIATRIC). - MEDICATION SUMMARY - MEDICATIONS TO STOP TAKING
Patient appeared to have slept throughout the night. Respirations even and unlabored. No acute behaviors. Q15 minute checks continue.   I will STOP taking the medications listed below when I get home from the hospital:  None

## 2025-05-28 RX ORDER — VALACYCLOVIR 500 MG/1
500 TABLET, FILM COATED ORAL
Qty: 40 | Refills: 3 | Status: ACTIVE | COMMUNITY
Start: 2025-05-28 | End: 1900-01-01

## 2025-09-12 ENCOUNTER — APPOINTMENT (OUTPATIENT)
Dept: OBGYN | Facility: CLINIC | Age: 35
End: 2025-09-12
Payer: MEDICAID

## 2025-09-12 VITALS
HEART RATE: 81 BPM | WEIGHT: 180 LBS | DIASTOLIC BLOOD PRESSURE: 70 MMHG | BODY MASS INDEX: 28.93 KG/M2 | HEIGHT: 66 IN | SYSTOLIC BLOOD PRESSURE: 115 MMHG

## 2025-09-12 DIAGNOSIS — L73.8 OTHER SPECIFIED FOLLICULAR DISORDERS: ICD-10-CM

## 2025-09-12 DIAGNOSIS — Z01.419 ENCOUNTER FOR GYNECOLOGICAL EXAMINATION (GENERAL) (ROUTINE) W/OUT ABNORMAL FINDINGS: ICD-10-CM

## 2025-09-12 DIAGNOSIS — Z11.3 ENCOUNTER FOR SCREENING FOR INFECTIONS WITH A PREDOMINANTLY SEXUAL MODE OF TRANSMISSION: ICD-10-CM

## 2025-09-12 PROCEDURE — 96127 BRIEF EMOTIONAL/BEHAV ASSMT: CPT

## 2025-09-12 PROCEDURE — 99459 PELVIC EXAMINATION: CPT

## 2025-09-12 PROCEDURE — 99395 PREV VISIT EST AGE 18-39: CPT | Mod: 25

## 2025-09-15 LAB
BV BACTERIA RRNA VAG QL NAA+PROBE: NOT DETECTED
C GLABRATA RNA VAG QL NAA+PROBE: NOT DETECTED
C TRACH RRNA SPEC QL NAA+PROBE: NOT DETECTED
CANDIDA RRNA VAG QL PROBE: NOT DETECTED
HBV SURFACE AG SER QL: NONREACTIVE
HCV AB SER QL: NONREACTIVE
HCV S/CO RATIO: 0.09 S/CO
HIV1+2 AB SPEC QL IA.RAPID: NONREACTIVE
HPV HIGH+LOW RISK DNA PNL CVX: NOT DETECTED
N GONORRHOEA RRNA SPEC QL NAA+PROBE: NOT DETECTED
T PALLIDUM AB SER QL IA: NEGATIVE
T VAGINALIS RRNA SPEC QL NAA+PROBE: NOT DETECTED

## 2025-09-17 LAB — CYTOLOGY CVX/VAG DOC THIN PREP: NORMAL

## 2025-09-26 PROBLEM — L02.32 BOIL, BUTTOCK: Status: ACTIVE | Noted: 2025-09-26

## (undated) DEVICE — TUBING SUCTION NONCONDUCTIVE 6MM X 12FT

## (undated) DEVICE — SOL IRR POUR H2O 500ML

## (undated) DEVICE — VISITEC 4X4

## (undated) DEVICE — LABELS BLANK W PEN

## (undated) DEVICE — GLV 7.5 PROTEXIS (WHITE)

## (undated) DEVICE — POSITIONER STRAP ARMBOARD VELCRO TS-30

## (undated) DEVICE — SUT PROLENE 1 30" CT-1

## (undated) DEVICE — DRAPE TOWEL BLUE 17" X 24"

## (undated) DEVICE — BASIN SET DOUBLE

## (undated) DEVICE — DRSG PAD SANITARY OB

## (undated) DEVICE — PACK PERI GYN

## (undated) DEVICE — ELCTR BOVIE PENCIL BLADE 10FT

## (undated) DEVICE — DRAPE LIGHT HANDLE COVER (GREEN)

## (undated) DEVICE — GOWN LG

## (undated) DEVICE — PREP BETADINE SPONGE STICKS

## (undated) DEVICE — DRSG TELFA 3 X 8

## (undated) DEVICE — VENODYNE/SCD SLEEVE CALF MEDIUM